# Patient Record
Sex: FEMALE | Race: WHITE | Employment: OTHER | URBAN - NONMETROPOLITAN AREA
[De-identification: names, ages, dates, MRNs, and addresses within clinical notes are randomized per-mention and may not be internally consistent; named-entity substitution may affect disease eponyms.]

---

## 2017-01-03 ENCOUNTER — TELEPHONE (OUTPATIENT)
Dept: CARDIOLOGY | Age: 82
End: 2017-01-03

## 2017-01-03 DIAGNOSIS — I10 ESSENTIAL HYPERTENSION: ICD-10-CM

## 2017-01-03 DIAGNOSIS — R01.1 HEART MURMUR: Primary | ICD-10-CM

## 2017-01-03 DIAGNOSIS — G47.33 OBSTRUCTIVE SLEEP APNEA TREATED WITH BIPAP: ICD-10-CM

## 2017-01-04 ENCOUNTER — TELEPHONE (OUTPATIENT)
Dept: PULMONOLOGY | Age: 82
End: 2017-01-04

## 2017-01-18 ENCOUNTER — TELEPHONE (OUTPATIENT)
Dept: CARDIOLOGY | Age: 82
End: 2017-01-18

## 2017-02-10 ENCOUNTER — OFFICE VISIT (OUTPATIENT)
Dept: PULMONOLOGY | Age: 82
End: 2017-02-10

## 2017-02-10 VITALS
DIASTOLIC BLOOD PRESSURE: 60 MMHG | HEART RATE: 80 BPM | BODY MASS INDEX: 23.89 KG/M2 | WEIGHT: 129.8 LBS | OXYGEN SATURATION: 96 % | SYSTOLIC BLOOD PRESSURE: 112 MMHG | HEIGHT: 62 IN

## 2017-02-10 DIAGNOSIS — G47.33 OSA ON CPAP: Primary | ICD-10-CM

## 2017-02-10 DIAGNOSIS — Z99.89 OSA ON CPAP: Primary | ICD-10-CM

## 2017-02-10 PROCEDURE — 1123F ACP DISCUSS/DSCN MKR DOCD: CPT | Performed by: PHYSICIAN ASSISTANT

## 2017-02-10 PROCEDURE — G8427 DOCREV CUR MEDS BY ELIG CLIN: HCPCS | Performed by: PHYSICIAN ASSISTANT

## 2017-02-10 PROCEDURE — 1090F PRES/ABSN URINE INCON ASSESS: CPT | Performed by: PHYSICIAN ASSISTANT

## 2017-02-10 PROCEDURE — G8400 PT W/DXA NO RESULTS DOC: HCPCS | Performed by: PHYSICIAN ASSISTANT

## 2017-02-10 PROCEDURE — G8484 FLU IMMUNIZE NO ADMIN: HCPCS | Performed by: PHYSICIAN ASSISTANT

## 2017-02-10 PROCEDURE — 99213 OFFICE O/P EST LOW 20 MIN: CPT | Performed by: PHYSICIAN ASSISTANT

## 2017-02-10 PROCEDURE — 1036F TOBACCO NON-USER: CPT | Performed by: PHYSICIAN ASSISTANT

## 2017-02-10 PROCEDURE — G8420 CALC BMI NORM PARAMETERS: HCPCS | Performed by: PHYSICIAN ASSISTANT

## 2017-02-10 PROCEDURE — 4040F PNEUMOC VAC/ADMIN/RCVD: CPT | Performed by: PHYSICIAN ASSISTANT

## 2017-05-10 ENCOUNTER — OFFICE VISIT (OUTPATIENT)
Dept: CARDIOLOGY | Age: 82
End: 2017-05-10

## 2017-05-10 VITALS
DIASTOLIC BLOOD PRESSURE: 64 MMHG | HEART RATE: 76 BPM | BODY MASS INDEX: 23.37 KG/M2 | WEIGHT: 127 LBS | HEIGHT: 62 IN | SYSTOLIC BLOOD PRESSURE: 120 MMHG

## 2017-05-10 DIAGNOSIS — E78.01 FAMILIAL HYPERCHOLESTEROLEMIA: ICD-10-CM

## 2017-05-10 DIAGNOSIS — I25.10 CORONARY ARTERY DISEASE INVOLVING NATIVE CORONARY ARTERY OF NATIVE HEART WITHOUT ANGINA PECTORIS: Primary | ICD-10-CM

## 2017-05-10 PROCEDURE — 99213 OFFICE O/P EST LOW 20 MIN: CPT | Performed by: NUCLEAR MEDICINE

## 2017-05-10 PROCEDURE — 1123F ACP DISCUSS/DSCN MKR DOCD: CPT | Performed by: NUCLEAR MEDICINE

## 2017-05-10 PROCEDURE — G8427 DOCREV CUR MEDS BY ELIG CLIN: HCPCS | Performed by: NUCLEAR MEDICINE

## 2017-05-10 PROCEDURE — G8400 PT W/DXA NO RESULTS DOC: HCPCS | Performed by: NUCLEAR MEDICINE

## 2017-05-10 PROCEDURE — 1090F PRES/ABSN URINE INCON ASSESS: CPT | Performed by: NUCLEAR MEDICINE

## 2017-05-10 PROCEDURE — 4040F PNEUMOC VAC/ADMIN/RCVD: CPT | Performed by: NUCLEAR MEDICINE

## 2017-05-10 PROCEDURE — G8599 NO ASA/ANTIPLAT THER USE RNG: HCPCS | Performed by: NUCLEAR MEDICINE

## 2017-05-10 PROCEDURE — 1036F TOBACCO NON-USER: CPT | Performed by: NUCLEAR MEDICINE

## 2017-05-10 PROCEDURE — G8420 CALC BMI NORM PARAMETERS: HCPCS | Performed by: NUCLEAR MEDICINE

## 2017-05-10 RX ORDER — ACETAMINOPHEN 160 MG
2000 TABLET,DISINTEGRATING ORAL
COMMUNITY
End: 2017-07-07 | Stop reason: ALTCHOICE

## 2017-05-12 ENCOUNTER — OFFICE VISIT (OUTPATIENT)
Dept: PULMONOLOGY | Age: 82
End: 2017-05-12

## 2017-05-12 VITALS
HEART RATE: 72 BPM | BODY MASS INDEX: 23.59 KG/M2 | HEIGHT: 62 IN | WEIGHT: 128.2 LBS | SYSTOLIC BLOOD PRESSURE: 116 MMHG | OXYGEN SATURATION: 98 % | DIASTOLIC BLOOD PRESSURE: 62 MMHG

## 2017-05-12 DIAGNOSIS — J98.9: ICD-10-CM

## 2017-05-12 DIAGNOSIS — Z98.2 S/P VP SHUNT: ICD-10-CM

## 2017-05-12 DIAGNOSIS — G47.33 OBSTRUCTIVE SLEEP APNEA TREATED WITH BIPAP: Primary | ICD-10-CM

## 2017-05-12 DIAGNOSIS — G47.31 COMPLEX SLEEP APNEA SYNDROME: ICD-10-CM

## 2017-05-12 PROCEDURE — G8599 NO ASA/ANTIPLAT THER USE RNG: HCPCS | Performed by: PHYSICIAN ASSISTANT

## 2017-05-12 PROCEDURE — 1036F TOBACCO NON-USER: CPT | Performed by: PHYSICIAN ASSISTANT

## 2017-05-12 PROCEDURE — G8420 CALC BMI NORM PARAMETERS: HCPCS | Performed by: PHYSICIAN ASSISTANT

## 2017-05-12 PROCEDURE — G8427 DOCREV CUR MEDS BY ELIG CLIN: HCPCS | Performed by: PHYSICIAN ASSISTANT

## 2017-05-12 PROCEDURE — 4040F PNEUMOC VAC/ADMIN/RCVD: CPT | Performed by: PHYSICIAN ASSISTANT

## 2017-05-12 PROCEDURE — G8400 PT W/DXA NO RESULTS DOC: HCPCS | Performed by: PHYSICIAN ASSISTANT

## 2017-05-12 PROCEDURE — 99214 OFFICE O/P EST MOD 30 MIN: CPT | Performed by: PHYSICIAN ASSISTANT

## 2017-05-12 PROCEDURE — 1090F PRES/ABSN URINE INCON ASSESS: CPT | Performed by: PHYSICIAN ASSISTANT

## 2017-05-12 PROCEDURE — 1123F ACP DISCUSS/DSCN MKR DOCD: CPT | Performed by: PHYSICIAN ASSISTANT

## 2017-05-18 ENCOUNTER — OFFICE VISIT (OUTPATIENT)
Dept: PULMONOLOGY | Age: 82
End: 2017-05-18

## 2017-05-18 VITALS
BODY MASS INDEX: 23.3 KG/M2 | OXYGEN SATURATION: 98 % | DIASTOLIC BLOOD PRESSURE: 58 MMHG | WEIGHT: 126.6 LBS | HEIGHT: 62 IN | HEART RATE: 77 BPM | SYSTOLIC BLOOD PRESSURE: 116 MMHG

## 2017-05-18 DIAGNOSIS — G47.33 OBSTRUCTIVE SLEEP APNEA TREATED WITH BIPAP: Primary | ICD-10-CM

## 2017-05-18 PROCEDURE — 1090F PRES/ABSN URINE INCON ASSESS: CPT | Performed by: PHYSICIAN ASSISTANT

## 2017-05-18 PROCEDURE — G8427 DOCREV CUR MEDS BY ELIG CLIN: HCPCS | Performed by: PHYSICIAN ASSISTANT

## 2017-05-18 PROCEDURE — 99212 OFFICE O/P EST SF 10 MIN: CPT | Performed by: PHYSICIAN ASSISTANT

## 2017-05-18 PROCEDURE — G8599 NO ASA/ANTIPLAT THER USE RNG: HCPCS | Performed by: PHYSICIAN ASSISTANT

## 2017-05-18 PROCEDURE — 1123F ACP DISCUSS/DSCN MKR DOCD: CPT | Performed by: PHYSICIAN ASSISTANT

## 2017-05-18 PROCEDURE — G8420 CALC BMI NORM PARAMETERS: HCPCS | Performed by: PHYSICIAN ASSISTANT

## 2017-05-18 PROCEDURE — 4040F PNEUMOC VAC/ADMIN/RCVD: CPT | Performed by: PHYSICIAN ASSISTANT

## 2017-05-18 PROCEDURE — 1036F TOBACCO NON-USER: CPT | Performed by: PHYSICIAN ASSISTANT

## 2017-05-18 PROCEDURE — G8400 PT W/DXA NO RESULTS DOC: HCPCS | Performed by: PHYSICIAN ASSISTANT

## 2017-07-07 ENCOUNTER — OFFICE VISIT (OUTPATIENT)
Dept: OTOLARYNGOLOGY | Age: 82
End: 2017-07-07

## 2017-07-07 VITALS
DIASTOLIC BLOOD PRESSURE: 60 MMHG | WEIGHT: 127.7 LBS | HEART RATE: 68 BPM | BODY MASS INDEX: 23.5 KG/M2 | HEIGHT: 62 IN | SYSTOLIC BLOOD PRESSURE: 120 MMHG | TEMPERATURE: 98.3 F | RESPIRATION RATE: 20 BRPM

## 2017-07-07 DIAGNOSIS — E05.90 HYPERTHYROIDISM: ICD-10-CM

## 2017-07-07 DIAGNOSIS — H93.13 TINNITUS, BILATERAL: ICD-10-CM

## 2017-07-07 DIAGNOSIS — G47.33 OBSTRUCTIVE SLEEP APNEA TREATED WITH BIPAP: Primary | ICD-10-CM

## 2017-07-07 PROCEDURE — G8420 CALC BMI NORM PARAMETERS: HCPCS | Performed by: OTOLARYNGOLOGY

## 2017-07-07 PROCEDURE — G8427 DOCREV CUR MEDS BY ELIG CLIN: HCPCS | Performed by: OTOLARYNGOLOGY

## 2017-07-07 PROCEDURE — G8400 PT W/DXA NO RESULTS DOC: HCPCS | Performed by: OTOLARYNGOLOGY

## 2017-07-07 PROCEDURE — 1090F PRES/ABSN URINE INCON ASSESS: CPT | Performed by: OTOLARYNGOLOGY

## 2017-07-07 PROCEDURE — 1036F TOBACCO NON-USER: CPT | Performed by: OTOLARYNGOLOGY

## 2017-07-07 PROCEDURE — 4040F PNEUMOC VAC/ADMIN/RCVD: CPT | Performed by: OTOLARYNGOLOGY

## 2017-07-07 PROCEDURE — 99203 OFFICE O/P NEW LOW 30 MIN: CPT | Performed by: OTOLARYNGOLOGY

## 2017-07-07 PROCEDURE — 1123F ACP DISCUSS/DSCN MKR DOCD: CPT | Performed by: OTOLARYNGOLOGY

## 2017-07-07 PROCEDURE — G8599 NO ASA/ANTIPLAT THER USE RNG: HCPCS | Performed by: OTOLARYNGOLOGY

## 2017-07-07 ASSESSMENT — ENCOUNTER SYMPTOMS
COLOR CHANGE: 0
SHORTNESS OF BREATH: 0
ABDOMINAL PAIN: 0
APNEA: 1
SINUS PRESSURE: 0
VOICE CHANGE: 0
NAUSEA: 0
SORE THROAT: 0
DIARRHEA: 0
CHOKING: 0
RHINORRHEA: 0
WHEEZING: 0
VOMITING: 0
TROUBLE SWALLOWING: 0
FACIAL SWELLING: 0
CHEST TIGHTNESS: 0
STRIDOR: 0
COUGH: 0

## 2017-10-04 ENCOUNTER — TELEPHONE (OUTPATIENT)
Dept: CARDIOLOGY CLINIC | Age: 82
End: 2017-10-04

## 2017-10-04 NOTE — TELEPHONE ENCOUNTER
Pre op Risk Assessment    Procedure Septoplasty   Physician Dr. Zeferino Kruse Fax [phone]  Date of surgery/procedure 10-20-17    Last OV 5-10-17  Last Stress 10-31-14  Last Echo 10-31-14

## 2017-10-06 ENCOUNTER — HOSPITAL ENCOUNTER (OUTPATIENT)
Age: 82
Discharge: HOME | End: 2017-10-06
Payer: MEDICARE

## 2017-10-06 LAB
ANION GAP SERPL CALCULATED.3IONS-SCNC: 15 MEQ/L (ref 8–16)
APTT: 30.1 SECONDS (ref 22–38)
BASOPHILS # BLD: 0.5 %
BASOPHILS ABSOLUTE: 0 THOU/MM3 (ref 0–0.1)
BUN BLDV-MCNC: 19 MG/DL (ref 7–22)
CALCIUM SERPL-MCNC: 9.3 MG/DL (ref 8.5–10.5)
CHLORIDE BLD-SCNC: 101 MEQ/L (ref 98–111)
CO2: 28 MEQ/L (ref 23–33)
CREAT SERPL-MCNC: 0.8 MG/DL (ref 0.4–1.2)
EOSINOPHIL # BLD: 3.6 %
EOSINOPHILS ABSOLUTE: 0.2 THOU/MM3 (ref 0–0.4)
GFR SERPL CREATININE-BSD FRML MDRD: 69 ML/MIN/1.73M2
GLUCOSE BLD-MCNC: 94 MG/DL (ref 70–108)
HCT VFR BLD CALC: 38.3 % (ref 37–47)
HEMOGLOBIN: 12.9 GM/DL (ref 12–16)
INR BLD: 0.93 (ref 0.85–1.13)
LYMPHOCYTES # BLD: 25.9 %
LYMPHOCYTES ABSOLUTE: 1.5 THOU/MM3 (ref 1–4.8)
MCH RBC QN AUTO: 30.1 PG (ref 27–31)
MCHC RBC AUTO-ENTMCNC: 33.5 GM/DL (ref 33–37)
MCV RBC AUTO: 89.7 FL (ref 81–99)
MONOCYTES # BLD: 7 %
MONOCYTES ABSOLUTE: 0.4 THOU/MM3 (ref 0.4–1.3)
NUCLEATED RED BLOOD CELLS: 0 /100 WBC
PDW BLD-RTO: 13.4 % (ref 11.5–14.5)
PLATELET # BLD: 197 THOU/MM3 (ref 130–400)
PMV BLD AUTO: 8.4 MCM (ref 7.4–10.4)
POTASSIUM SERPL-SCNC: 4.1 MEQ/L (ref 3.5–5.2)
RBC # BLD: 4.27 MILL/MM3 (ref 4.2–5.4)
RBC # BLD: NORMAL 10*6/UL
SEG NEUTROPHILS: 63 %
SEGMENTED NEUTROPHILS ABSOLUTE COUNT: 3.6 THOU/MM3 (ref 1.8–7.7)
SODIUM BLD-SCNC: 144 MEQ/L (ref 135–145)
WBC # BLD: 5.7 THOU/MM3 (ref 4.8–10.8)

## 2017-10-06 PROCEDURE — 93005 ELECTROCARDIOGRAM TRACING: CPT | Performed by: OTOLARYNGOLOGY

## 2017-10-06 PROCEDURE — 85730 THROMBOPLASTIN TIME PARTIAL: CPT

## 2017-10-06 PROCEDURE — 85610 PROTHROMBIN TIME: CPT

## 2017-10-06 PROCEDURE — 36415 COLL VENOUS BLD VENIPUNCTURE: CPT

## 2017-10-06 PROCEDURE — 80048 BASIC METABOLIC PNL TOTAL CA: CPT

## 2017-10-06 PROCEDURE — 85025 COMPLETE CBC W/AUTO DIFF WBC: CPT

## 2017-10-07 LAB
EKG ATRIAL RATE: 60 BPM
EKG P AXIS: 63 DEGREES
EKG P-R INTERVAL: 186 MS
EKG Q-T INTERVAL: 440 MS
EKG QRS DURATION: 98 MS
EKG QTC CALCULATION (BAZETT): 440 MS
EKG R AXIS: 91 DEGREES
EKG T AXIS: -30 DEGREES
EKG VENTRICULAR RATE: 60 BPM

## 2017-10-24 ENCOUNTER — OFFICE VISIT (OUTPATIENT)
Dept: PULMONOLOGY | Age: 82
End: 2017-10-24
Payer: MEDICARE

## 2017-10-24 ENCOUNTER — TELEPHONE (OUTPATIENT)
Dept: PULMONOLOGY | Age: 82
End: 2017-10-24

## 2017-10-24 VITALS
HEIGHT: 62 IN | WEIGHT: 128.2 LBS | DIASTOLIC BLOOD PRESSURE: 60 MMHG | OXYGEN SATURATION: 95 % | HEART RATE: 82 BPM | BODY MASS INDEX: 23.59 KG/M2 | SYSTOLIC BLOOD PRESSURE: 116 MMHG

## 2017-10-24 DIAGNOSIS — G47.33 OBSTRUCTIVE SLEEP APNEA TREATED WITH BIPAP: Primary | ICD-10-CM

## 2017-10-24 PROCEDURE — G8427 DOCREV CUR MEDS BY ELIG CLIN: HCPCS | Performed by: PHYSICIAN ASSISTANT

## 2017-10-24 PROCEDURE — 1123F ACP DISCUSS/DSCN MKR DOCD: CPT | Performed by: PHYSICIAN ASSISTANT

## 2017-10-24 PROCEDURE — 1090F PRES/ABSN URINE INCON ASSESS: CPT | Performed by: PHYSICIAN ASSISTANT

## 2017-10-24 PROCEDURE — G8420 CALC BMI NORM PARAMETERS: HCPCS | Performed by: PHYSICIAN ASSISTANT

## 2017-10-24 PROCEDURE — 4040F PNEUMOC VAC/ADMIN/RCVD: CPT | Performed by: PHYSICIAN ASSISTANT

## 2017-10-24 PROCEDURE — 1036F TOBACCO NON-USER: CPT | Performed by: PHYSICIAN ASSISTANT

## 2017-10-24 PROCEDURE — G8484 FLU IMMUNIZE NO ADMIN: HCPCS | Performed by: PHYSICIAN ASSISTANT

## 2017-10-24 PROCEDURE — G8599 NO ASA/ANTIPLAT THER USE RNG: HCPCS | Performed by: PHYSICIAN ASSISTANT

## 2017-10-24 PROCEDURE — 99213 OFFICE O/P EST LOW 20 MIN: CPT | Performed by: PHYSICIAN ASSISTANT

## 2017-10-24 PROCEDURE — G8400 PT W/DXA NO RESULTS DOC: HCPCS | Performed by: PHYSICIAN ASSISTANT

## 2017-10-24 NOTE — PATIENT INSTRUCTIONS
Health Maintenance Due   Topic Date Due    DTaP/Tdap/Td vaccine (1 - Tdap) 06/06/1955    Zostavax vaccine  06/06/1996    DEXA (modify frequency per FRAX score)  06/06/2001    Pneumococcal low/med risk (1 of 2 - PCV13) 06/06/2001    Flu vaccine (1) 09/01/2017

## 2017-10-24 NOTE — PROGRESS NOTES
[facility]      Humble Oswald                                         [id number]  10/24/2017   Chief Complaint   Patient presents with    Sleep Apnea     5 month f/u pt has had surgery Dr Patti Carl) needs pressure adjusted shrme download        Pt of Dr. Ajay Shore    PAP Download:   Original or initial  AHI: 50.3     Date of initial study: 11/12/13    [x] Compliant  96.7%   []  Noncompliant 3.3 %     PAP Type Bi-pap   Level  9/13   Avg Hrs/Day [id number]  AHI: 20.8   Recorded compliance dates , 9/20/17  to 10/19/17- prior to OR  Machine/Mfg: Respironics Interface: full    Provider:  [x][facility]  []Christina  [][facility]  [][facility]         [][facility] []Other:   Neck Size: 14  Mallampati 2  ESS: 20       Presentation:   Fredy Schwartz presents for sleep medicine follow up for obstructive sleep apnea  Since the last visit, Fredy Schwartz is doing reasonably well with their sleep machine. Other comments: She has been difficult to titrate and control AHI. She was referred to ENT for evaluation of possible upper airway obstruction. She was initally seen by Dr. Jasen Rodríguez and did not feel comfortable with him. She went to Dr. Patti Salas in [facility]. She had her septum repaired and nasal polyp removed. She states that she can breath so much easier. She just had procedure done a few days ago and had not started using PAP again. She has had many issues with mask leak also. Equipment issues: The pressure is somewhat  acceptable, the mask is acceptable and she  is  using the humidity. Progress History:   Since last visit any new medical issues? Yes nasal surgery  New ER or hospitlal visits? No  Any new or changes in medicines? No  Any new sleep medicines? No    Sleep issues:  Do you feel better? No  More rested? No   Better concentration?  no      Past Medical History:   Diagnosis Date    Arthritis     CAD (coronary artery disease)     Essential hypertension     Heart murmur    

## 2017-10-26 ENCOUNTER — HOSPITAL ENCOUNTER (OUTPATIENT)
Dept: CT IMAGING | Age: 82
Discharge: HOME | End: 2017-10-26
Payer: MEDICARE

## 2017-10-26 DIAGNOSIS — G91.9 HYDROCEPHALUS (HCC): ICD-10-CM

## 2017-10-26 DIAGNOSIS — G91.2 IDIOPATHIC NORMAL PRESSURE HYDROCEPHALUS (HCC): ICD-10-CM

## 2017-10-26 PROCEDURE — 70450 CT HEAD/BRAIN W/O DYE: CPT

## 2018-01-31 ENCOUNTER — OFFICE VISIT (OUTPATIENT)
Dept: PULMONOLOGY | Age: 83
End: 2018-01-31
Payer: MEDICARE

## 2018-01-31 VITALS
SYSTOLIC BLOOD PRESSURE: 124 MMHG | HEIGHT: 63 IN | HEART RATE: 71 BPM | BODY MASS INDEX: 23.21 KG/M2 | WEIGHT: 131 LBS | OXYGEN SATURATION: 98 % | DIASTOLIC BLOOD PRESSURE: 62 MMHG

## 2018-01-31 DIAGNOSIS — G47.33 OBSTRUCTIVE SLEEP APNEA TREATED WITH BIPAP: Primary | ICD-10-CM

## 2018-01-31 PROCEDURE — G8420 CALC BMI NORM PARAMETERS: HCPCS | Performed by: PHYSICIAN ASSISTANT

## 2018-01-31 PROCEDURE — 99213 OFFICE O/P EST LOW 20 MIN: CPT | Performed by: PHYSICIAN ASSISTANT

## 2018-01-31 PROCEDURE — G8400 PT W/DXA NO RESULTS DOC: HCPCS | Performed by: PHYSICIAN ASSISTANT

## 2018-01-31 PROCEDURE — G8484 FLU IMMUNIZE NO ADMIN: HCPCS | Performed by: PHYSICIAN ASSISTANT

## 2018-01-31 PROCEDURE — G8427 DOCREV CUR MEDS BY ELIG CLIN: HCPCS | Performed by: PHYSICIAN ASSISTANT

## 2018-01-31 PROCEDURE — 1123F ACP DISCUSS/DSCN MKR DOCD: CPT | Performed by: PHYSICIAN ASSISTANT

## 2018-01-31 PROCEDURE — 1036F TOBACCO NON-USER: CPT | Performed by: PHYSICIAN ASSISTANT

## 2018-01-31 PROCEDURE — 4040F PNEUMOC VAC/ADMIN/RCVD: CPT | Performed by: PHYSICIAN ASSISTANT

## 2018-01-31 PROCEDURE — 1090F PRES/ABSN URINE INCON ASSESS: CPT | Performed by: PHYSICIAN ASSISTANT

## 2018-01-31 RX ORDER — SODIUM CHLORIDE/ALOE VERA
GEL (GRAM) NASAL PRN
COMMUNITY
End: 2018-09-11

## 2018-01-31 NOTE — PATIENT INSTRUCTIONS
Health Maintenance Due   Topic Date Due    DTaP/Tdap/Td vaccine (1 - Tdap) 06/06/1955    Zostavax vaccine  06/06/1996    DEXA (modify frequency per FRAX score)  06/06/2001    Pneumococcal low/med risk (1 of 2 - PCV13) 06/06/2001    TSH testing  12/12/2015

## 2018-01-31 NOTE — PROGRESS NOTES
[facility]      Andrey Mcmahan                                         [id number]  2018   Chief Complaint   Patient presents with    Sleep Apnea     MISAEL, 3 mo f/u download        Pt of Dr. Cande Kearns    PAP Download:   Original or initial  AHI: 50.3     Date of initial study: 13    [x] Compliant  95.%   -  Noncompliant 4.3 %     PAP Type BiPAP   Level  8.0 / 12.0   Avg Hrs/Day 7:09:38  AHI: 18.5   Recorded compliance dates , 1/3/18  to 18   Machine/Mfg: Respironics Interface: FFM    Provider:  [x][facility]  [][facility]  [][employer]  [][facility]         [][facility] []Other:   Neck Size: 14\"  Mallampati Mallampati 2  ESS:  18    Here is a scan of the most recent download:          Presentation:   Rossi Smith presents for sleep medicine follow up for obstructive sleep apnea  Since the last visit, Rossi Smith is doing reasonably well with their sleep machine. Other comments: She has been very difficult to control her apneas. She had a re-titration in Dec 2016 and has never been controlled since. She even had septoplasty to try to improve air issues. She has had issues with a large leak also and was changed to the air touch mask a week ago and her leak appears improved. Equipment issues: The pressure is  acceptable, the mask is acceptable and she  is  using the humidity. Sleep issues:  Do you feel better? Yes  More rested? Yes   Better concentration? yes    Progress History:   Since last visit any new medical issues? No  New ER or hospitlal visits? No  Any new or changes in medicines? No  Any new sleep medicines?  No        Past Medical History:   Diagnosis Date    Arthritis     CAD (coronary artery disease)     Essential hypertension     Heart murmur     Hyperlipidemia     Sleep apnea     Thyroid disease     Hypothyroid    VSD (ventricular septal defect)        Past Surgical History:   Procedure Laterality Date    BLADDER SUSPENSION       SECTION      x2  COLONOSCOPY      DILATATION, ESOPHAGUS      DOPPLER ECHOCARDIOGRAPHY  10-01-10    EF 45-50%    EYE SURGERY Bilateral     cataracts    HYSTERECTOMY  1990    KNEE CARTILAGE SURGERY Right     OTHER SURGICAL HISTORY  6/1/2015    VAGINAL ANTERIOR AND POSTERIOR REPAIR , SOLYX BLADDER SLING, CYSTOSCOPY    SHOULDER SURGERY Right     TUBAL LIGATION  1962    VENTRICULOPERITONEAL SHUNT Right 10/11/2016    Dr Kesha De Oliveira       Social History   Substance Use Topics    Smoking status: Never Smoker    Smokeless tobacco: Never Used    Alcohol use No       Allergies   Allergen Reactions    Iv Dye [Iodides]     Sulfa Antibiotics Hives    Iodine Rash     blisters       Current Outpatient Prescriptions   Medication Sig Dispense Refill    saline nasal gel (AYR) GEL by Nasal route as needed for Congestion      CPAP Machine MISC by Does not apply route Please change BiPAP pressure to IPAP 12 EPAP 8 cm H20. 1 each 0    Probiotic Product (PROBIOTIC DAILY PO) Take by mouth daily      Multiple Vitamin (MULTI VITAMIN PO) Take by mouth daily      CALCIUM PO Take by mouth daily      metroNIDAZOLE (METROCREAM) 0.75 % cream daily  0    levothyroxine (SYNTHROID) 112 MCG tablet Take 112 mcg by mouth Daily.  simvastatin (ZOCOR) 10 MG tablet Take 10 mg by mouth nightly. No current facility-administered medications for this visit.         Family History   Problem Relation Age of Onset    Cancer Sister     Heart Disease Sister         Review of Systems -   General ROS: gained 3 lbs over 3 months  ENT ROS: negative for - nasal congestion, oral lesions or sore throat  Hematological and Lymphatic ROS: negative  Endocrine ROS: negative  Respiratory ROS: no cough, shortness of breath, or wheezing  Cardiovascular ROS: no chest pain   Gastrointestinal ROS: no abdominal pain, change in bowel habits, or black or bloody stools  Musculoskeletal ROS: negative  Neurological ROS: negative    Physical Exam:    BMI:  Body mass index

## 2018-05-02 ENCOUNTER — HOSPITAL ENCOUNTER (OUTPATIENT)
Dept: CT IMAGING | Age: 83
Discharge: HOME | End: 2018-05-02
Payer: MEDICARE

## 2018-05-02 DIAGNOSIS — G91.2 NPH (NORMAL PRESSURE HYDROCEPHALUS) (HCC): ICD-10-CM

## 2018-05-02 PROCEDURE — 70450 CT HEAD/BRAIN W/O DYE: CPT

## 2018-05-16 ENCOUNTER — OFFICE VISIT (OUTPATIENT)
Dept: CARDIOLOGY CLINIC | Age: 83
End: 2018-05-16
Payer: MEDICARE

## 2018-05-16 VITALS
HEIGHT: 62 IN | BODY MASS INDEX: 23.55 KG/M2 | WEIGHT: 128 LBS | SYSTOLIC BLOOD PRESSURE: 120 MMHG | DIASTOLIC BLOOD PRESSURE: 46 MMHG | HEART RATE: 72 BPM

## 2018-05-16 DIAGNOSIS — I25.10 CORONARY ARTERY DISEASE INVOLVING NATIVE CORONARY ARTERY OF NATIVE HEART WITHOUT ANGINA PECTORIS: Primary | ICD-10-CM

## 2018-05-16 DIAGNOSIS — E78.00 PURE HYPERCHOLESTEROLEMIA: ICD-10-CM

## 2018-05-16 PROCEDURE — G8400 PT W/DXA NO RESULTS DOC: HCPCS | Performed by: NUCLEAR MEDICINE

## 2018-05-16 PROCEDURE — 99213 OFFICE O/P EST LOW 20 MIN: CPT | Performed by: NUCLEAR MEDICINE

## 2018-05-16 PROCEDURE — 1036F TOBACCO NON-USER: CPT | Performed by: NUCLEAR MEDICINE

## 2018-05-16 PROCEDURE — 1090F PRES/ABSN URINE INCON ASSESS: CPT | Performed by: NUCLEAR MEDICINE

## 2018-05-16 PROCEDURE — G8599 NO ASA/ANTIPLAT THER USE RNG: HCPCS | Performed by: NUCLEAR MEDICINE

## 2018-05-16 PROCEDURE — G8427 DOCREV CUR MEDS BY ELIG CLIN: HCPCS | Performed by: NUCLEAR MEDICINE

## 2018-05-16 PROCEDURE — 4040F PNEUMOC VAC/ADMIN/RCVD: CPT | Performed by: NUCLEAR MEDICINE

## 2018-05-16 PROCEDURE — 1123F ACP DISCUSS/DSCN MKR DOCD: CPT | Performed by: NUCLEAR MEDICINE

## 2018-05-16 PROCEDURE — G8420 CALC BMI NORM PARAMETERS: HCPCS | Performed by: NUCLEAR MEDICINE

## 2018-05-16 RX ORDER — PANTOPRAZOLE SODIUM 40 MG/1
40 TABLET, DELAYED RELEASE ORAL DAILY
COMMUNITY
End: 2018-08-01 | Stop reason: ALTCHOICE

## 2018-08-01 ENCOUNTER — OFFICE VISIT (OUTPATIENT)
Dept: PULMONOLOGY | Age: 83
End: 2018-08-01
Payer: MEDICARE

## 2018-08-01 VITALS
BODY MASS INDEX: 23.59 KG/M2 | OXYGEN SATURATION: 98 % | SYSTOLIC BLOOD PRESSURE: 110 MMHG | DIASTOLIC BLOOD PRESSURE: 62 MMHG | WEIGHT: 128.2 LBS | HEIGHT: 62 IN | HEART RATE: 73 BPM

## 2018-08-01 DIAGNOSIS — G47.33 OBSTRUCTIVE SLEEP APNEA TREATED WITH BIPAP: ICD-10-CM

## 2018-08-01 DIAGNOSIS — G47.31 COMPLEX SLEEP APNEA SYNDROME: Primary | ICD-10-CM

## 2018-08-01 PROCEDURE — G8599 NO ASA/ANTIPLAT THER USE RNG: HCPCS | Performed by: PHYSICIAN ASSISTANT

## 2018-08-01 PROCEDURE — G8400 PT W/DXA NO RESULTS DOC: HCPCS | Performed by: PHYSICIAN ASSISTANT

## 2018-08-01 PROCEDURE — 1123F ACP DISCUSS/DSCN MKR DOCD: CPT | Performed by: PHYSICIAN ASSISTANT

## 2018-08-01 PROCEDURE — G8427 DOCREV CUR MEDS BY ELIG CLIN: HCPCS | Performed by: PHYSICIAN ASSISTANT

## 2018-08-01 PROCEDURE — 99213 OFFICE O/P EST LOW 20 MIN: CPT | Performed by: PHYSICIAN ASSISTANT

## 2018-08-01 PROCEDURE — 1101F PT FALLS ASSESS-DOCD LE1/YR: CPT | Performed by: PHYSICIAN ASSISTANT

## 2018-08-01 PROCEDURE — 1036F TOBACCO NON-USER: CPT | Performed by: PHYSICIAN ASSISTANT

## 2018-08-01 PROCEDURE — 1090F PRES/ABSN URINE INCON ASSESS: CPT | Performed by: PHYSICIAN ASSISTANT

## 2018-08-01 PROCEDURE — G8420 CALC BMI NORM PARAMETERS: HCPCS | Performed by: PHYSICIAN ASSISTANT

## 2018-08-01 PROCEDURE — 4040F PNEUMOC VAC/ADMIN/RCVD: CPT | Performed by: PHYSICIAN ASSISTANT

## 2018-08-01 ASSESSMENT — ENCOUNTER SYMPTOMS
SPUTUM PRODUCTION: 0
GASTROINTESTINAL NEGATIVE: 1
HEARTBURN: 0
EYES NEGATIVE: 1
WHEEZING: 0
RESPIRATORY NEGATIVE: 1
SINUS PAIN: 0
SORE THROAT: 0
ORTHOPNEA: 0
COUGH: 0
SHORTNESS OF BREATH: 0
NAUSEA: 0

## 2018-08-01 NOTE — PROGRESS NOTES
Laterality Date    BLADDER SUSPENSION       SECTION      x2    COLONOSCOPY      DILATATION, ESOPHAGUS      DOPPLER ECHOCARDIOGRAPHY  10-01-10    EF 45-50%    EYE SURGERY Bilateral     cataracts    HYSTERECTOMY      KNEE CARTILAGE SURGERY Right     OTHER SURGICAL HISTORY  2015    VAGINAL ANTERIOR AND POSTERIOR REPAIR , SOLYX BLADDER SLING, CYSTOSCOPY    SHOULDER SURGERY Right     SINUS SURGERY      TUBAL LIGATION      VENTRICULOPERITONEAL SHUNT Right 10/11/2016    Dr Isac Thompson       Social History   Substance Use Topics    Smoking status: Never Smoker    Smokeless tobacco: Never Used    Alcohol use No       Allergies   Allergen Reactions    Iv Dye [Iodides]     Sulfa Antibiotics Hives    Iodine Rash     blisters       Current Outpatient Prescriptions   Medication Sig Dispense Refill    saline nasal gel (AYR) GEL by Nasal route as needed for Congestion      CPAP Machine MISC by Does not apply route Please change BIPAP pressure to IPAP 13 and EPAP 10 cm H20. 1 each 0    Probiotic Product (PROBIOTIC DAILY PO) Take by mouth daily      Multiple Vitamin (MULTI VITAMIN PO) Take by mouth daily      CALCIUM PO Take by mouth daily      metroNIDAZOLE (METROCREAM) 0.75 % cream daily  0    levothyroxine (SYNTHROID) 112 MCG tablet Take 112 mcg by mouth Daily.  simvastatin (ZOCOR) 10 MG tablet Take 10 mg by mouth nightly. No current facility-administered medications for this visit. Family History   Problem Relation Age of Onset    Cancer Sister     Heart Disease Sister         Review of Systems -   Review of Systems   Constitutional: Negative. Negative for chills and fever. HENT: Negative. Negative for congestion, nosebleeds, sinus pain and sore throat. Eyes: Negative. Respiratory: Negative. Negative for cough, sputum production, shortness of breath and wheezing. Cardiovascular: Negative. Negative for chest pain, orthopnea and PND. Gastrointestinal: Negative. Negative for heartburn and nausea. Genitourinary: Negative. Musculoskeletal: Negative. Negative for joint pain and myalgias. Skin: Negative. Neurological: Negative. Negative for dizziness, weakness and headaches. Endo/Heme/Allergies: Negative. Psychiatric/Behavioral: Negative. Negative for depression. The patient is not nervous/anxious and does not have insomnia. All other systems reviewed and are negative. Physical Exam:    BMI:  Body mass index is 23.45 kg/m². Wt Readings from Last 3 Encounters:   08/01/18 128 lb 3.2 oz (58.2 kg)   05/16/18 128 lb (58.1 kg)   01/31/18 131 lb (59.4 kg)     Vitals: /62 (Site: Left Arm, Position: Sitting)   Pulse 73   Ht 5' 2\" (1.575 m)   Wt 128 lb 3.2 oz (58.2 kg)   SpO2 98%   BMI 23.45 kg/m²       Physical Exam   Constitutional: She is oriented to person, place, and time and well-developed, well-nourished, and in no distress. No distress. HENT:   Head: Normocephalic and atraumatic. Mouth/Throat: Oropharynx is clear and moist. No oropharyngeal exudate. Eyes: Pupils are equal, round, and reactive to light. Neck: Normal range of motion. Neck supple. Cardiovascular: Normal rate, regular rhythm and normal heart sounds. Pulmonary/Chest: Effort normal and breath sounds normal. No stridor. No respiratory distress. Abdominal: Soft. Bowel sounds are normal.   Musculoskeletal: Normal range of motion. She exhibits no edema. Neurological: She is alert and oriented to person, place, and time. Gait normal.   Skin: Skin is warm and dry. She is not diaphoretic. Psychiatric: Mood, memory, affect and judgment normal.         ASSESSMENT/DIAGNOSIS     Diagnosis Orders   1. Obstructive sleep apnea treated with BiPAP              Plan   Do you need any equipment today? No  - AHI continues to be elevated.   Suspect her central effects are related to NPH and shunt  - Will make adjustment to Auto Bipap IPAP max 14 and EPAP min 6 and PS 4  - Download in 1 month and if no improvement will need bipap/ASV titration  - She  was advised to continue current positive airway pressure therapy with above described pressure. - She  advised to keep good compliance with current recommended pressure to get optimal results and clinical improvement  - Recommend 7-9 hours of sleep with PAP  - She was advised to call [employer] regarding supplies if needed. - She call my office for earlier appointment if needed for worsening of sleep symptoms.   - She was instructed on weight loss  - Bessy Skelton was educated about my impression and plan. Patient verbalizes understanding.   We will see Fatou Lancaster back pending next download    Bhanu Olivares PA-C, ASHLI  8/1/2018

## 2018-08-29 ENCOUNTER — HOSPITAL ENCOUNTER (OUTPATIENT)
Dept: CT IMAGING | Age: 83
Discharge: HOME | End: 2018-08-29
Payer: MEDICARE

## 2018-08-29 DIAGNOSIS — Z98.2 VP (VENTRICULOPERITONEAL) SHUNT STATUS: ICD-10-CM

## 2018-08-29 DIAGNOSIS — G91.2 NPH (NORMAL PRESSURE HYDROCEPHALUS) (HCC): ICD-10-CM

## 2018-08-29 PROCEDURE — 70450 CT HEAD/BRAIN W/O DYE: CPT

## 2018-09-11 ENCOUNTER — OFFICE VISIT (OUTPATIENT)
Dept: PULMONOLOGY | Age: 83
End: 2018-09-11
Payer: MEDICARE

## 2018-09-11 VITALS
HEIGHT: 62 IN | OXYGEN SATURATION: 98 % | SYSTOLIC BLOOD PRESSURE: 124 MMHG | WEIGHT: 126.6 LBS | HEART RATE: 63 BPM | BODY MASS INDEX: 23.3 KG/M2 | DIASTOLIC BLOOD PRESSURE: 68 MMHG

## 2018-09-11 DIAGNOSIS — G47.31 COMPLEX SLEEP APNEA SYNDROME: Primary | ICD-10-CM

## 2018-09-11 PROCEDURE — G8427 DOCREV CUR MEDS BY ELIG CLIN: HCPCS | Performed by: PHYSICIAN ASSISTANT

## 2018-09-11 PROCEDURE — G8400 PT W/DXA NO RESULTS DOC: HCPCS | Performed by: PHYSICIAN ASSISTANT

## 2018-09-11 PROCEDURE — 1090F PRES/ABSN URINE INCON ASSESS: CPT | Performed by: PHYSICIAN ASSISTANT

## 2018-09-11 PROCEDURE — G8420 CALC BMI NORM PARAMETERS: HCPCS | Performed by: PHYSICIAN ASSISTANT

## 2018-09-11 PROCEDURE — 4040F PNEUMOC VAC/ADMIN/RCVD: CPT | Performed by: PHYSICIAN ASSISTANT

## 2018-09-11 PROCEDURE — G8599 NO ASA/ANTIPLAT THER USE RNG: HCPCS | Performed by: PHYSICIAN ASSISTANT

## 2018-09-11 PROCEDURE — 99213 OFFICE O/P EST LOW 20 MIN: CPT | Performed by: PHYSICIAN ASSISTANT

## 2018-09-11 PROCEDURE — 1123F ACP DISCUSS/DSCN MKR DOCD: CPT | Performed by: PHYSICIAN ASSISTANT

## 2018-09-11 PROCEDURE — 1036F TOBACCO NON-USER: CPT | Performed by: PHYSICIAN ASSISTANT

## 2018-09-11 PROCEDURE — 1101F PT FALLS ASSESS-DOCD LE1/YR: CPT | Performed by: PHYSICIAN ASSISTANT

## 2018-09-11 ASSESSMENT — ENCOUNTER SYMPTOMS
SINUS PAIN: 0
COUGH: 0
SPUTUM PRODUCTION: 0
SHORTNESS OF BREATH: 0
WHEEZING: 0
HEARTBURN: 0
GASTROINTESTINAL NEGATIVE: 1
SORE THROAT: 0
NAUSEA: 0
EYES NEGATIVE: 1
ORTHOPNEA: 0
RESPIRATORY NEGATIVE: 1

## 2018-09-11 NOTE — PROGRESS NOTES
SURGERY Bilateral     cataracts    HYSTERECTOMY  1990    KNEE CARTILAGE SURGERY Right     OTHER SURGICAL HISTORY  6/1/2015    VAGINAL ANTERIOR AND POSTERIOR REPAIR , SOLYX BLADDER SLING, CYSTOSCOPY    SHOULDER SURGERY Right     SINUS SURGERY      TUBAL LIGATION  1962    VENTRICULOPERITONEAL SHUNT Right 10/11/2016    Dr Alvaro Nguyen       Social History   Substance Use Topics    Smoking status: Never Smoker    Smokeless tobacco: Never Used    Alcohol use No       Allergies   Allergen Reactions    Iv Dye [Iodides]     Sulfa Antibiotics Hives    Iodine Rash     blisters       Current Outpatient Prescriptions   Medication Sig Dispense Refill    CPAP Machine MISC by Does not apply route Please change BIPAP pressure to auto IPAP max 14 and EPAP min 6 and PS 4  cm H20. 1 each 0    Probiotic Product (PROBIOTIC DAILY PO) Take by mouth daily      Multiple Vitamin (MULTI VITAMIN PO) Take by mouth daily      CALCIUM PO Take by mouth daily      metroNIDAZOLE (METROCREAM) 0.75 % cream daily  0    levothyroxine (SYNTHROID) 112 MCG tablet Take 112 mcg by mouth Daily.  simvastatin (ZOCOR) 10 MG tablet Take 10 mg by mouth nightly. No current facility-administered medications for this visit. Family History   Problem Relation Age of Onset    Cancer Sister     Heart Disease Sister         Review of Systems -   Review of Systems   Constitutional: Negative. Negative for chills and fever. HENT: Negative. Negative for congestion, nosebleeds, sinus pain and sore throat. Eyes: Negative. Respiratory: Negative. Negative for cough, sputum production, shortness of breath and wheezing. Cardiovascular: Negative. Negative for chest pain, orthopnea and PND. Gastrointestinal: Negative. Negative for heartburn and nausea. Genitourinary: Negative. Musculoskeletal: Negative. Negative for joint pain and myalgias. Skin: Negative. Neurological: Negative.   Negative for dizziness, weakness and is more receptive now. - She  was advised to continue current positive airway pressure therapy with above described pressure. - She  advised to keep good compliance with current recommended pressure to get optimal results and clinical improvement  - Recommend 7-9 hours of sleep with PAP  - She was advised to call [employer] regarding supplies if needed. - She call my office for earlier appointment if needed for worsening of sleep symptoms.   - She was instructed on weight loss  - Edamaria esther Wan was educated about my impression and plan. Patient verbalizes understanding.   We will see Elder Shoulder back in: 3 months with download    Michelle Bonner PA-C, MPAS  9/11/2018

## 2019-03-12 ENCOUNTER — OFFICE VISIT (OUTPATIENT)
Dept: PULMONOLOGY | Age: 84
End: 2019-03-12
Payer: MEDICARE

## 2019-03-12 VITALS
DIASTOLIC BLOOD PRESSURE: 68 MMHG | WEIGHT: 130 LBS | HEIGHT: 62 IN | OXYGEN SATURATION: 98 % | HEART RATE: 68 BPM | SYSTOLIC BLOOD PRESSURE: 108 MMHG | BODY MASS INDEX: 23.92 KG/M2

## 2019-03-12 DIAGNOSIS — G91.2 NPH (NORMAL PRESSURE HYDROCEPHALUS) (HCC): Chronic | ICD-10-CM

## 2019-03-12 DIAGNOSIS — Q21.0 VSD (VENTRICULAR SEPTAL DEFECT): ICD-10-CM

## 2019-03-12 DIAGNOSIS — G47.31 COMPLEX SLEEP APNEA SYNDROME: Primary | ICD-10-CM

## 2019-03-12 PROCEDURE — G8427 DOCREV CUR MEDS BY ELIG CLIN: HCPCS | Performed by: PHYSICIAN ASSISTANT

## 2019-03-12 PROCEDURE — 1036F TOBACCO NON-USER: CPT | Performed by: PHYSICIAN ASSISTANT

## 2019-03-12 PROCEDURE — G8420 CALC BMI NORM PARAMETERS: HCPCS | Performed by: PHYSICIAN ASSISTANT

## 2019-03-12 PROCEDURE — 1090F PRES/ABSN URINE INCON ASSESS: CPT | Performed by: PHYSICIAN ASSISTANT

## 2019-03-12 PROCEDURE — 1123F ACP DISCUSS/DSCN MKR DOCD: CPT | Performed by: PHYSICIAN ASSISTANT

## 2019-03-12 PROCEDURE — 4040F PNEUMOC VAC/ADMIN/RCVD: CPT | Performed by: PHYSICIAN ASSISTANT

## 2019-03-12 PROCEDURE — 1101F PT FALLS ASSESS-DOCD LE1/YR: CPT | Performed by: PHYSICIAN ASSISTANT

## 2019-03-12 PROCEDURE — 99214 OFFICE O/P EST MOD 30 MIN: CPT | Performed by: PHYSICIAN ASSISTANT

## 2019-03-12 PROCEDURE — G8484 FLU IMMUNIZE NO ADMIN: HCPCS | Performed by: PHYSICIAN ASSISTANT

## 2019-03-12 PROCEDURE — G8400 PT W/DXA NO RESULTS DOC: HCPCS | Performed by: PHYSICIAN ASSISTANT

## 2019-03-12 ASSESSMENT — ENCOUNTER SYMPTOMS
SHORTNESS OF BREATH: 0
BACK PAIN: 0
NAUSEA: 0
COUGH: 0
STRIDOR: 0
EYES NEGATIVE: 1
CHEST TIGHTNESS: 0
DIARRHEA: 0
ALLERGIC/IMMUNOLOGIC NEGATIVE: 1
WHEEZING: 0

## 2019-03-20 ENCOUNTER — HOSPITAL ENCOUNTER (OUTPATIENT)
Dept: NON INVASIVE DIAGNOSTICS | Age: 84
Discharge: HOME | End: 2019-03-20
Payer: MEDICARE

## 2019-03-20 DIAGNOSIS — G47.31 COMPLEX SLEEP APNEA SYNDROME: ICD-10-CM

## 2019-03-20 LAB
LV EF: 60 %
LVEF MODALITY: NORMAL

## 2019-03-20 PROCEDURE — 93306 TTE W/DOPPLER COMPLETE: CPT

## 2019-03-21 ENCOUNTER — TELEPHONE (OUTPATIENT)
Dept: PULMONOLOGY | Age: 84
End: 2019-03-21

## 2019-04-22 ENCOUNTER — HOSPITAL ENCOUNTER (OUTPATIENT)
Dept: SLEEP CENTER | Age: 84
Discharge: HOME | End: 2019-04-24
Payer: MEDICARE

## 2019-04-22 DIAGNOSIS — G47.31 COMPLEX SLEEP APNEA SYNDROME: ICD-10-CM

## 2019-04-22 PROCEDURE — 95811 POLYSOM 6/>YRS CPAP 4/> PARM: CPT

## 2019-04-23 DIAGNOSIS — G47.33 OSA TREATED WITH BIPAP: Primary | ICD-10-CM

## 2019-04-23 LAB — STATUS: NORMAL

## 2019-04-24 NOTE — PROGRESS NOTES
[address] [place] [address]                               SLEEP STUDY REPORT    PATIENT NAME: Stacey Covermurray                    :        1935  MED REC NO:   [id number]                           ROOM:  ACCOUNT NO:   [de-identified]                           ADMIT DATE: 2019  PROVIDER:     Clementine Wiley. MD [place]    DATE OF STUDY:  2019    BIPAP RETITRATION STUDY REPORT    REFERRING PROVIDER:  Can Back PA-C    The patient's height is 62 inches, weight is 130 pounds with a BMI of  23.8. HISTORY:  The patient is an 28-year-old female diagnosed with moderately  severe obstructive sleep apnea in the past.  The patient was titrated to  a BiPAP pressure of 15/11 cm of water in the past.  She had a recent  followup with Ms. Can Back PA-C, on 2019. She was put  on auto-BiPAP with a maximum IPAP of 14, minimum EPAP of 6, maximum  pressure support of 3, and minimum pressure support of 1 with Bi-Flex of  2. With the above auto-BiPAP settings, the patient was still found to  have a residual apnea-hypopnea index of 13.6. Due to high suspicion for  tramadol sleep apnea, the patient scheduled for in-lab titration. The  had echocardiogram performed on 2019. The patient found to have  an ejection fraction of 60% with a grade 1 diastolic dysfunction.     METHODS:  The patient underwent digital polysomnography in compliance  with the standards and specifications from the AASM Manual including the  simultaneous recording of 3 EEG channels (F4-M1, C4-M1, and O2-M1 with  back up electrodes F3-M2, C3-M2, and O1-M2), 2 EOG channels (E1-M2, and  E2-M1,), EMG (chin, left & right leg), EKG, Nonin pulse oximetry with   less than 2 second averaging time, body position, airflow recorded by  oral-nasal thermal sensor and nasal air pressure transducer, plus  respiratory effort recorded by calibrated respiratory inductance  plethysmography (RIP), flow volume loop, sound and video. Sleep staging  and scoring followed the standard put forth by the American Academy of  Sleep Medicine and utilized the 4A obstructive hypopnea event  desaturation of 4 percent or greater. INTERPRETATION:  This is a BiPAP retitration study and the study was  performed on 04/22/2019. The study was started at 09:41 p.m. and was  terminated at 05:13 a.m. with a total recording time of 451.8 minutes  and the sleep period time was 446.4 minutes. Total sleep time was 316.9  minutes and overall sleep efficiency was 70.1%. The sleep onset latency  was 5.4 minutes, wake after sleep onset was 129.5 minutes, and the REM  sleep latency was 196.5 minutes. SLEEP STAGING AND DISTRIBUTION SUMMARY:  Revealed the patient spent 16  minutes in stage I consisting of 5%, 243.4 minutes in stage II  consisting of 76.8%, 10.5 minutes in stage III consisting of 3.3%, 47  minutes in REM sleep consisting of 14.8% of total sleep time. BIPAP RETITRATION STUDY:  The BiPAP retitration was started with a BiPAP  pressure of 10/6 cm of water. The BiPAP pressure was gradually  increased to a final BiPAP pressure of 18/14 cm of water. At a final  BiPAP pressure of 18/14 cm of water, the patient spent 3 hours 7 minutes  in bed. Out of 3 hours 7 minutes, the patient slept for a period of  30.3 minutes in REM sleep and 1 hour 54 minutes in non-REM sleep. The  patient was found to have a total of four obstructive apneas and one  obstructive hypopnea event at a final BiPAP pressure of 18/14 cm of  water. At a final BiPAP pressure of 18/14 cm of water, the patient had  an apnea-hypopnea index of 2.1. The maximum oxygen desaturation  recorded at this pressure was 93% with a mean oxygen saturation of  96.6%. PERIODIC LIMB MOVEMENT ANALYSIS:  Revealed the patient did not have any  periodic limb movements.   The patient had a total of 51 spontaneous  arousals with a spontaneous arousal index of 9.7. EKG MONITORING:  Revealed normal sinus rhythm. IMPRESSION:  1. Moderately severe obstructive sleep apnea. The patient had optimal  retitration to a BiPAP pressure of 18/14 cm of water. 2.  Coronary artery disease. 3.  Essential hypertension. RECOMMENDATIONS:  1. For the patient's sleep-disordered breathing, we recommended  adjusting the patient's current BiPAP settings to a fixed BiPAP pressure  of 18/14 cm of water. 2.  Specific recommendations include the patient's choice of interface  was AirFit small-size full face mask. 3.  The patient should be scheduled for a followup with Ms. Domingo Quintana PA-C, clinic in six to eight weeks on recommended BiPAP  pressures for clinical re-evaluation with review of download. Thanks to Ms. Domingo Quintana PA-C, for giving me this opportunity  to participate in the care of this pleasant lady.         Judy Doherty MD    D: 04/23/2019 19:32:48       T: 04/23/2019 22:52:40     [email or web address]  Job#: [id number]     Doc#: [id number]    CC:

## 2019-04-25 ENCOUNTER — TELEPHONE (OUTPATIENT)
Dept: SLEEP CENTER | Age: 84
End: 2019-04-25

## 2019-04-25 NOTE — TELEPHONE ENCOUNTER
Faxed BiPAP setup order to [address] [place] [address]- [facility]  per patient's request.      Armand Michelle  4/25/2019

## 2019-05-15 ENCOUNTER — OFFICE VISIT (OUTPATIENT)
Dept: CARDIOLOGY CLINIC | Age: 84
End: 2019-05-15
Payer: MEDICARE

## 2019-05-15 VITALS
HEART RATE: 69 BPM | DIASTOLIC BLOOD PRESSURE: 66 MMHG | SYSTOLIC BLOOD PRESSURE: 120 MMHG | BODY MASS INDEX: 24.48 KG/M2 | HEIGHT: 62 IN | WEIGHT: 133 LBS

## 2019-05-15 DIAGNOSIS — I25.10 CORONARY ARTERY DISEASE INVOLVING NATIVE CORONARY ARTERY OF NATIVE HEART WITHOUT ANGINA PECTORIS: Primary | ICD-10-CM

## 2019-05-15 DIAGNOSIS — Q21.0 VSD (VENTRICULAR SEPTAL DEFECT AND AORTIC ARCH HYPOPLASIA: ICD-10-CM

## 2019-05-15 DIAGNOSIS — E78.00 PURE HYPERCHOLESTEROLEMIA: ICD-10-CM

## 2019-05-15 DIAGNOSIS — Q25.42 VSD (VENTRICULAR SEPTAL DEFECT AND AORTIC ARCH HYPOPLASIA: ICD-10-CM

## 2019-05-15 PROCEDURE — 1123F ACP DISCUSS/DSCN MKR DOCD: CPT | Performed by: NUCLEAR MEDICINE

## 2019-05-15 PROCEDURE — 4040F PNEUMOC VAC/ADMIN/RCVD: CPT | Performed by: NUCLEAR MEDICINE

## 2019-05-15 PROCEDURE — G8420 CALC BMI NORM PARAMETERS: HCPCS | Performed by: NUCLEAR MEDICINE

## 2019-05-15 PROCEDURE — 1036F TOBACCO NON-USER: CPT | Performed by: NUCLEAR MEDICINE

## 2019-05-15 PROCEDURE — 99213 OFFICE O/P EST LOW 20 MIN: CPT | Performed by: NUCLEAR MEDICINE

## 2019-05-15 PROCEDURE — G8400 PT W/DXA NO RESULTS DOC: HCPCS | Performed by: NUCLEAR MEDICINE

## 2019-05-15 PROCEDURE — 1090F PRES/ABSN URINE INCON ASSESS: CPT | Performed by: NUCLEAR MEDICINE

## 2019-05-15 PROCEDURE — 93000 ELECTROCARDIOGRAM COMPLETE: CPT | Performed by: NUCLEAR MEDICINE

## 2019-05-15 PROCEDURE — G8428 CUR MEDS NOT DOCUMENT: HCPCS | Performed by: NUCLEAR MEDICINE

## 2019-05-15 PROCEDURE — G8599 NO ASA/ANTIPLAT THER USE RNG: HCPCS | Performed by: NUCLEAR MEDICINE

## 2019-05-15 NOTE — PROGRESS NOTES
[address]  [address]  [facility] [address]  Dept: [phone]  Dept Fax: [phone]  Loc: [phone]    Visit Date: 5/15/2019    Kayden Milian is a 81 y.o. female who presents todayfor:  Chief Complaint   Patient presents with    Coronary Artery Disease    Hypertension    Ventricular Septal Defect   known small VSD   And mild CAD  Cath few years back   No chest pain   Does have a baseline dyspnea  No syncope  BP is stable        HPI:  HPI  Past Medical History:   Diagnosis Date    Arthritis     CAD (coronary artery disease)     Essential hypertension     Heart murmur     Hyperlipidemia     Sleep apnea     Thyroid disease     Hypothyroid    VSD (ventricular septal defect)       Past Surgical History:   Procedure Laterality Date    BLADDER SUSPENSION       SECTION      x2    COLONOSCOPY      DILATATION, ESOPHAGUS      DOPPLER ECHOCARDIOGRAPHY  10-01-10    EF 45-50%    EYE SURGERY Bilateral     cataracts    HYSTERECTOMY      KNEE CARTILAGE SURGERY Right     OTHER SURGICAL HISTORY  2015    VAGINAL ANTERIOR AND POSTERIOR REPAIR , SOLYX BLADDER SLING, CYSTOSCOPY    SHOULDER SURGERY Right     SINUS SURGERY      TUBAL LIGATION      VENTRICULOPERITONEAL SHUNT Right 10/11/2016    Dr Perla Ojeda     Family History   Problem Relation Age of Onset    Cancer Sister     Heart Disease Sister      Social History     Tobacco Use    Smoking status: Never Smoker    Smokeless tobacco: Never Used   Substance Use Topics    Alcohol use: No     Alcohol/week: 0.0 oz      Current Outpatient Medications   Medication Sig Dispense Refill    CPAP Machine MISC by Does not apply route Please change Max IPAP 14 EPAP min 6 and PS min 1 and PS max 3 cm H20. 1 each 0    Probiotic Product (PROBIOTIC DAILY PO) Take by mouth daily      Multiple Vitamin (MULTI VITAMIN PO) Take by mouth daily      CALCIUM PO Take by mouth daily  metroNIDAZOLE (METROCREAM) 0.75 % cream daily  0    levothyroxine (SYNTHROID) 112 MCG tablet Take 112 mcg by mouth Daily.  simvastatin (ZOCOR) 10 MG tablet Take 10 mg by mouth nightly. No current facility-administered medications for this visit. Allergies   Allergen Reactions    Iv Dye [Iodides]     Sulfa Antibiotics Hives    Iodine Rash     blisters     Health Maintenance   Topic Date Due    DTaP/Tdap/Td vaccine (1 - Tdap) 06/06/1955    Shingles Vaccine (1 of 2) 06/06/1986    DEXA (modify frequency per FRAX score)  06/06/2001    Pneumococcal 65+ years Vaccine (1 of 2 - PCV13) 06/06/2001    TSH testing  12/12/2015    Potassium monitoring  05/04/2019    Creatinine monitoring  05/04/2019    Flu vaccine (Season Ended) 09/01/2019       Subjective:  Review of Systems  General:   No fever, no chills, No fatigue or weight loss  Pulmonary:    No dyspnea, no wheezing  Cardiac:    Denies recent chest pain,   GI:     No nausea or vomiting, no abdominal pain  Neuro:    No dizziness or light headedness,   Musculoskeletal:  No recent active issues  Extremities:   No edema, good peripheral pulses      Objective:  Physical Exam  /66   Pulse 69   Ht 5' 2\" (1.575 m)   Wt 133 lb (60.3 kg)   BMI 24.33 kg/m²   General:   Well developed, well nourished  Lungs:   Clear to auscultation  Heart:    Normal S1 S2, Slight murmur. no rubs, no gallops  Abdomen:   Soft, non tender, no organomegalies, positive bowel sounds  Extremities:   No edema, no cyanosis, good peripheral pulses  Neurological:   Awake, alert, oriented. No obvious focal deficits  Musculoskelatal:  No obvious deformities    Assessment:      Diagnosis Orders   1. Coronary artery disease involving native coronary artery of native heart without angina pectoris  EKG 12 lead   2. Pure hypercholesterolemia  EKG 12 lead   3. VSD (ventricular septal defect and aortic arch hypoplasia     cardiac fair for now  ECG in office was done today.  I reviewed the ECG. No acute findings    Plan:  No follow-ups on file. As above  Continue risk factor modification and medical management  Thank you for allowing me to participate in the care of your patient. Please don't hesitate to contact me regarding any further issues related to the patient care    Orders Placed:  Orders Placed This Encounter   Procedures    EKG 12 lead     Order Specific Question:   Reason for Exam?     Answer: Other       Medications Prescribed:  No orders of the defined types were placed in this encounter. Discussed use, benefit, and side effects of prescribed medications. All patient questions answered. Pt voicedunderstanding. Instructed to continue current medications, diet and exercise. Continue risk factor modification and medical management. Patient agreed with treatment plan. Follow up as directed.     Electronically signedby Bernard Álvarez MD on 5/15/2019 at 10:01 AM

## 2019-06-18 ENCOUNTER — OFFICE VISIT (OUTPATIENT)
Dept: PULMONOLOGY | Age: 84
End: 2019-06-18
Payer: MEDICARE

## 2019-06-18 VITALS
BODY MASS INDEX: 24.59 KG/M2 | WEIGHT: 133.6 LBS | HEART RATE: 64 BPM | SYSTOLIC BLOOD PRESSURE: 124 MMHG | DIASTOLIC BLOOD PRESSURE: 68 MMHG | HEIGHT: 62 IN | OXYGEN SATURATION: 99 %

## 2019-06-18 DIAGNOSIS — Q21.0 VSD (VENTRICULAR SEPTAL DEFECT): ICD-10-CM

## 2019-06-18 DIAGNOSIS — G47.31 COMPLEX SLEEP APNEA SYNDROME: Primary | ICD-10-CM

## 2019-06-18 PROCEDURE — G8599 NO ASA/ANTIPLAT THER USE RNG: HCPCS | Performed by: PHYSICIAN ASSISTANT

## 2019-06-18 PROCEDURE — 99213 OFFICE O/P EST LOW 20 MIN: CPT | Performed by: PHYSICIAN ASSISTANT

## 2019-06-18 PROCEDURE — 4040F PNEUMOC VAC/ADMIN/RCVD: CPT | Performed by: PHYSICIAN ASSISTANT

## 2019-06-18 PROCEDURE — G8400 PT W/DXA NO RESULTS DOC: HCPCS | Performed by: PHYSICIAN ASSISTANT

## 2019-06-18 PROCEDURE — 1090F PRES/ABSN URINE INCON ASSESS: CPT | Performed by: PHYSICIAN ASSISTANT

## 2019-06-18 PROCEDURE — G8427 DOCREV CUR MEDS BY ELIG CLIN: HCPCS | Performed by: PHYSICIAN ASSISTANT

## 2019-06-18 PROCEDURE — 1036F TOBACCO NON-USER: CPT | Performed by: PHYSICIAN ASSISTANT

## 2019-06-18 PROCEDURE — G8420 CALC BMI NORM PARAMETERS: HCPCS | Performed by: PHYSICIAN ASSISTANT

## 2019-06-18 PROCEDURE — 1123F ACP DISCUSS/DSCN MKR DOCD: CPT | Performed by: PHYSICIAN ASSISTANT

## 2019-06-18 ASSESSMENT — ENCOUNTER SYMPTOMS
NAUSEA: 0
ALLERGIC/IMMUNOLOGIC NEGATIVE: 1
SHORTNESS OF BREATH: 0
COUGH: 0
DIARRHEA: 0
STRIDOR: 0
WHEEZING: 0
EYES NEGATIVE: 1
CHEST TIGHTNESS: 0
BACK PAIN: 0

## 2019-06-18 NOTE — PROGRESS NOTES
[facility]      Michael Morse         [id number]  2019   Chief Complaint   Patient presents with    Follow-up     8 week MISAEL with [facility] download        Pt of Dr. Mary Alice Guajardo     PAP Download:   Original or initialAHI: 50.3     Date of initial study: 13      Compliant  100%     Noncompliant 0 %     PAP Type auto  Level  14/18 cmh2o    Avg Hrs/Day 7:6  AHI: 9.8   Recorded compliance dates , 19-19   Machine/Mfg: respironics Interface: ffm    Provider:    _[email or web address]           Araseli Valle        __ Dede Umanzor    __ Ali Graec            __[facility] __Adaptive   __[place]:       __Other    Neck Size: 14  Mallampati Mallampati 3  ESS:  15  SAQLI: 85    Here is a scan of the most recent download:              Presentation:   Davon Reynoso presents for sleep medicine follow up for obstructive sleep apnea  Since the last visit, Davon Reynoso is doing well with PAP. She had a re-titration. Her AHI has been very difficult to control. She had a re-titration after failing multiple pressure changes and auto bipap. Her AHI is still elevated but the best its been. Her mask leaks occ but the best seal she has had. Equipment issues: The pressure is  acceptable, the mask is acceptable and she  is  using the humidity. Sleep issues:  Do you feel better? Yes  More rested? Yes   Better concentration? yes    Progress History:   Since last visit any new medical issues? No  New ER or hospitlal visits? No  Any new or changes in medicines? No  Any new sleep medicines?  No        Past Medical History:   Diagnosis Date    Arthritis     CAD (coronary artery disease)     Essential hypertension     Heart murmur     Hyperlipidemia     Sleep apnea     Thyroid disease     Hypothyroid    VSD (ventricular septal defect)        Past Surgical History:   Procedure Laterality Date    BLADDER SUSPENSION  2011     SECTION      x2    COLONOSCOPY      DILATATION, ESOPHAGUS      DOPPLER ECHOCARDIOGRAPHY  10-01-10    EF 45-50%    EYE SURGERY Bilateral     cataracts    HYSTERECTOMY  1990    KNEE CARTILAGE SURGERY Right     OTHER SURGICAL HISTORY  6/1/2015    VAGINAL ANTERIOR AND POSTERIOR REPAIR , SOLYX BLADDER SLING, CYSTOSCOPY    SHOULDER SURGERY Right     SINUS SURGERY      TUBAL LIGATION  1962    VENTRICULOPERITONEAL SHUNT Right 10/11/2016    Dr Isac Thompson       Social History     Tobacco Use    Smoking status: Never Smoker    Smokeless tobacco: Never Used   Substance Use Topics    Alcohol use: No     Alcohol/week: 0.0 oz    Drug use: No       Allergies   Allergen Reactions    Iv Dye [Iodides]     Sulfa Antibiotics Hives    Iodine Rash     blisters       Current Outpatient Medications   Medication Sig Dispense Refill    CPAP Machine MISC by Does not apply route Please change Max IPAP 14 EPAP min 6 and PS min 1 and PS max 3 cm H20. 1 each 0    Probiotic Product (PROBIOTIC DAILY PO) Take by mouth daily      Multiple Vitamin (MULTI VITAMIN PO) Take by mouth daily      CALCIUM PO Take by mouth daily      metroNIDAZOLE (METROCREAM) 0.75 % cream daily  0    levothyroxine (SYNTHROID) 112 MCG tablet Take 112 mcg by mouth Daily.  simvastatin (ZOCOR) 10 MG tablet Take 10 mg by mouth nightly. No current facility-administered medications for this visit. Family History   Problem Relation Age of Onset    Cancer Sister     Heart Disease Sister         Review of Systems -   Review of Systems   Constitutional: Negative for activity change, appetite change, chills and fever. HENT: Negative for congestion and postnasal drip. Eyes: Negative. Respiratory: Negative for cough, chest tightness, shortness of breath, wheezing and stridor. Cardiovascular: Negative for chest pain and leg swelling. Gastrointestinal: Negative for diarrhea and nausea. Endocrine: Negative. Genitourinary: Negative. Musculoskeletal: Negative. Negative for arthralgias and back pain. Skin: Negative. Allergic/Immunologic: Negative. Neurological: Negative. Negative for dizziness and light-headedness. Psychiatric/Behavioral: Negative. All other systems reviewed and are negative. Physical Exam:    BMI:  Body mass index is 24.44 kg/m². Wt Readings from Last 3 Encounters:   06/18/19 133 lb 9.6 oz (60.6 kg)   05/15/19 133 lb (60.3 kg)   03/12/19 130 lb (59 kg)     Weight stable / unchanged  Vitals: /68 (Site: Left Upper Arm, Position: Sitting)   Pulse 64   Ht 5' 2\" (1.575 m)   Wt 133 lb 9.6 oz (60.6 kg)   SpO2 99% Comment: on RA  BMI 24.44 kg/m²       Physical Exam   Constitutional: She is oriented to person, place, and time. She appears well-developed and well-nourished. HENT:   Head: Normocephalic and atraumatic. Right Ear: External ear normal.   Left Ear: External ear normal.   Mouth/Throat: Oropharynx is clear and moist.   Eyes: Pupils are equal, round, and reactive to light. Conjunctivae and EOM are normal.   Neck: Normal range of motion. Neck supple. Cardiovascular: Normal rate, regular rhythm and normal heart sounds. Pulmonary/Chest: Effort normal and breath sounds normal.   Abdominal: Soft. Musculoskeletal: Normal range of motion. Neurological: She is alert and oriented to person, place, and time. Skin: Skin is warm and dry. Psychiatric: She has a normal mood and affect. Her behavior is normal. Judgment and thought content normal.         ASSESSMENT/DIAGNOSIS     Diagnosis Orders   1. Complex sleep apnea syndrome     2. VSD (ventricular septal defect)              Plan   Do you need any equipment today? No  - This is the best her AHI has been  - She  was advised to continue current positive airway pressure therapy with above described pressure.    - She  advised to keep goodcompliance with current recommended pressure to get optimal results and clinical improvement  - Recommend 7-9 hours of sleep with PAP  - She was advised to call DME company regarding supplies if needed.   -She call my office for earlier appointment if needed for worsening of sleep symptoms.   - She was instructed on weight loss  - Pattie Buchanan was educated about my impression and plan. Patient verbalizesunderstanding.   We will see Tiffani Necessary back in: 1 year with download    Information added by my medical assistant/LPN was reviewed today         Monisha Simpson PA-C, MPAS  6/18/2019

## 2019-07-31 ENCOUNTER — HOSPITAL ENCOUNTER (OUTPATIENT)
Dept: CT IMAGING | Age: 84
Discharge: HOME | End: 2019-07-31
Payer: MEDICARE

## 2019-07-31 DIAGNOSIS — G91.2 (IDIOPATHIC) NORMAL PRESSURE HYDROCEPHALUS (HCC): ICD-10-CM

## 2019-07-31 DIAGNOSIS — G44.52 HEADACHE, NEW DAILY PERSISTENT (NDPH): ICD-10-CM

## 2019-07-31 PROCEDURE — 70450 CT HEAD/BRAIN W/O DYE: CPT

## 2019-09-24 ENCOUNTER — HOSPITAL ENCOUNTER (EMERGENCY)
Age: 84
Discharge: HOME | End: 2019-09-24
Payer: MEDICARE

## 2019-09-24 VITALS
BODY MASS INDEX: 24.51 KG/M2 | RESPIRATION RATE: 16 BRPM | WEIGHT: 134 LBS | HEART RATE: 70 BPM | DIASTOLIC BLOOD PRESSURE: 70 MMHG | TEMPERATURE: 98.1 F | OXYGEN SATURATION: 97 % | SYSTOLIC BLOOD PRESSURE: 153 MMHG
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
## 2019-09-24 DIAGNOSIS — Z23 NEED FOR DIPHTHERIA-TETANUS-PERTUSSIS (TDAP) VACCINE: Primary | ICD-10-CM

## 2019-09-24 PROCEDURE — 6360000002 HC RX W HCPCS: Performed by: NURSE PRACTITIONER

## 2019-09-24 PROCEDURE — 99213 OFFICE O/P EST LOW 20 MIN: CPT | Performed by: NURSE PRACTITIONER

## 2019-09-24 PROCEDURE — 99212 OFFICE O/P EST SF 10 MIN: CPT

## 2019-09-24 PROCEDURE — 2709999900 HC NON-CHARGEABLE SUPPLY

## 2019-09-24 PROCEDURE — 90471 IMMUNIZATION ADMIN: CPT | Performed by: NURSE PRACTITIONER

## 2019-09-24 PROCEDURE — 90715 TDAP VACCINE 7 YRS/> IM: CPT | Performed by: NURSE PRACTITIONER

## 2019-09-24 RX ORDER — AMOXICILLIN AND CLAVULANATE POTASSIUM 875; 125 MG/1; MG/1
1 TABLET, FILM COATED ORAL 2 TIMES DAILY
Qty: 10 TABLET | Refills: 0 | Status: SHIPPED | OUTPATIENT
Start: 2019-09-24 | End: 2019-09-29

## 2019-09-24 RX ADMIN — TETANUS TOXOID, REDUCED DIPHTHERIA TOXOID AND ACELLULAR PERTUSSIS VACCINE, ADSORBED 0.5 ML: 5; 2.5; 8; 8; 2.5 SUSPENSION INTRAMUSCULAR at 12:35

## 2019-09-24 ASSESSMENT — PAIN SCALES - GENERAL: PAINLEVEL_OUTOF10: 2

## 2019-09-24 ASSESSMENT — PAIN DESCRIPTION - PAIN TYPE: TYPE: ACUTE PAIN

## 2019-09-24 ASSESSMENT — PAIN DESCRIPTION - FREQUENCY: FREQUENCY: INTERMITTENT

## 2019-09-24 ASSESSMENT — PAIN DESCRIPTION - ORIENTATION: ORIENTATION: RIGHT

## 2019-09-24 ASSESSMENT — ENCOUNTER SYMPTOMS
GASTROINTESTINAL NEGATIVE: 1
COLOR CHANGE: 0

## 2019-09-24 ASSESSMENT — PAIN DESCRIPTION - DESCRIPTORS: DESCRIPTORS: TENDER

## 2019-09-24 ASSESSMENT — PAIN DESCRIPTION - LOCATION: LOCATION: HAND

## 2019-09-24 NOTE — ED PROVIDER NOTES
distress. Musculoskeletal:        Right knee: She exhibits normal range of motion. Left knee: She exhibits normal range of motion. Right hand: She exhibits tenderness (dorsal aspect around the abrasion when she bumps it.  ). She exhibits normal range of motion, no bony tenderness, normal capillary refill, no deformity, no laceration and no swelling. Normal sensation noted. Normal strength noted. Neurological: She is alert and oriented to person, place, and time. No sensory deficit. Skin: Skin is warm and dry. Capillary refill takes less than 2 seconds. Abrasion (superficial abrasion to dorsal aspect of right hand, no signs of infection, no redness, warmth, no drainage.  ) noted. No bruising, no ecchymosis, no petechiae and no rash noted. She is not diaphoretic. No cyanosis or erythema. No pallor. Psychiatric: She has a normal mood and affect. Her behavior is normal.   Nursing note and vitals reviewed. DIAGNOSTIC RESULTS   Labs:No results found for this visit on 09/24/19. IMAGING:    URGENT CARE COURSE:     Vitals:    09/24/19 1204   BP: (!) 153/70   Pulse: 70   Resp: 16   Temp: 98.1 °F (36.7 °C)   TempSrc: Oral   SpO2: 97%   Weight: 134 lb (60.8 kg)       Medications   Tetanus-Diphth-Acell Pertussis (BOOSTRIX) injection 0.5 mL (0.5 mLs Intramuscular Given 9/24/19 1235)     PROCEDURES:  None  FINAL IMPRESSION       1. Need for diphtheria-tetanus-pertussis (Tdap) vaccine    2.  Dog scratch        DISPOSITION/PLAN   DISPOSITION Decision To Discharge 09/24/2019 12:32:28 PM  tdap administered per request  Keep hand abrasion clean with soap and water, pat dry, leave open to air  Monitor for redness, swelling, warmth, streaking, drainage, fevers make appointment or go to nearest urgent care for evaluation      PATIENT REFERRED TO:  Yumiko Damon MD  [address] [place], [place] [address] [phone]    Schedule an appointment as soon as possible for a visit in 2

## 2020-05-20 ENCOUNTER — OFFICE VISIT (OUTPATIENT)
Dept: CARDIOLOGY CLINIC | Age: 85
End: 2020-05-20
Payer: MEDICARE

## 2020-05-20 VITALS
HEIGHT: 62 IN | WEIGHT: 132.8 LBS | DIASTOLIC BLOOD PRESSURE: 68 MMHG | SYSTOLIC BLOOD PRESSURE: 138 MMHG | HEART RATE: 76 BPM | BODY MASS INDEX: 24.44 KG/M2

## 2020-05-20 PROCEDURE — 93000 ELECTROCARDIOGRAM COMPLETE: CPT | Performed by: NUCLEAR MEDICINE

## 2020-05-20 PROCEDURE — G8400 PT W/DXA NO RESULTS DOC: HCPCS | Performed by: NUCLEAR MEDICINE

## 2020-05-20 PROCEDURE — G8427 DOCREV CUR MEDS BY ELIG CLIN: HCPCS | Performed by: NUCLEAR MEDICINE

## 2020-05-20 PROCEDURE — 1123F ACP DISCUSS/DSCN MKR DOCD: CPT | Performed by: NUCLEAR MEDICINE

## 2020-05-20 PROCEDURE — G8420 CALC BMI NORM PARAMETERS: HCPCS | Performed by: NUCLEAR MEDICINE

## 2020-05-20 PROCEDURE — 99213 OFFICE O/P EST LOW 20 MIN: CPT | Performed by: NUCLEAR MEDICINE

## 2020-05-20 PROCEDURE — 1090F PRES/ABSN URINE INCON ASSESS: CPT | Performed by: NUCLEAR MEDICINE

## 2020-05-20 PROCEDURE — 1036F TOBACCO NON-USER: CPT | Performed by: NUCLEAR MEDICINE

## 2020-05-20 PROCEDURE — 4040F PNEUMOC VAC/ADMIN/RCVD: CPT | Performed by: NUCLEAR MEDICINE

## 2020-05-20 NOTE — PROGRESS NOTES
[facility],[place] [id number]  Dept: [phone]  Dept Fax: [phone]  Loc: [phone]    Visit Date: 2020    Adriana Rush is a 81 y.o. female who presents todayfor:  Chief Complaint   Patient presents with    Check-Up    Coronary Artery Disease    Ventricular Septal Defect     Known chronic VSD  Known mild CAD  Cath a while  Back   No chest pain   No changes in breathing  No dizziness  No syncope  No new issues      HPI:  HPI  Past Medical History:   Diagnosis Date    Arthritis     CAD (coronary artery disease)     Heart murmur     Hyperlipidemia     Sleep apnea     Thyroid disease     Hypothyroid    VSD (ventricular septal defect)       Past Surgical History:   Procedure Laterality Date    BLADDER SUSPENSION       SECTION      x2    COLONOSCOPY      DILATATION, ESOPHAGUS      DOPPLER ECHOCARDIOGRAPHY  10-01-10    EF 45-50%    EYE SURGERY Bilateral     cataracts    HYSTERECTOMY      KNEE CARTILAGE SURGERY Right     OTHER SURGICAL HISTORY  2015    VAGINAL ANTERIOR AND POSTERIOR REPAIR , SOLYX BLADDER SLING, CYSTOSCOPY    SHOULDER SURGERY Right     SINUS SURGERY      TUBAL LIGATION      VENTRICULOPERITONEAL SHUNT Right 10/11/2016    Dr Ceasar Galan     Family History   Problem Relation Age of Onset    Cancer Sister     Heart Disease Sister      Social History     Tobacco Use    Smoking status: Never Smoker    Smokeless tobacco: Never Used   Substance Use Topics    Alcohol use: No     Alcohol/week: 0.0 standard drinks      Current Outpatient Medications   Medication Sig Dispense Refill    CPAP Machine MISC by Does not apply route Please change Max IPAP 14 EPAP min 6 and PS min 1 and PS max 3 cm H20. 1 each 0    Probiotic Product (PROBIOTIC DAILY PO) Take by mouth daily      Multiple Vitamin (MULTI VITAMIN PO) Take by mouth daily      CALCIUM PO Take by mouth daily      native heart without angina pectoris  EKG 12 Lead   2. VSD (ventricular septal defect and aortic arch hypoplasia     cardiac fair for now   ECG in office was done today. I reviewed the ECG. No acute findings      Plan:  No follow-ups on file. As above  Continue risk factor modification and medical management  Thank you for allowing me to participate in the care of your patient. Please don't hesitate to contact me regarding any further issues related to the patient care    Orders Placed:  Orders Placed This Encounter   Procedures    EKG 12 Lead     Order Specific Question:   Reason for Exam?     Answer: Other       Medications Prescribed:  No orders of the defined types were placed in this encounter. Discussed use, benefit, and side effects of prescribed medications. All patient questions answered. Pt voicedunderstanding. Instructed to continue current medications, diet and exercise. Continue risk factor modification and medical management. Patient agreed with treatment plan. Follow up as directed.     Electronically signedby Irineo English MD on 5/20/2020 at 10:10 AM

## 2020-06-24 ENCOUNTER — OFFICE VISIT (OUTPATIENT)
Dept: PULMONOLOGY | Age: 85
End: 2020-06-24
Payer: MEDICARE

## 2020-06-24 VITALS
TEMPERATURE: 97.6 F | BODY MASS INDEX: 24.33 KG/M2 | WEIGHT: 133 LBS | HEART RATE: 70 BPM | DIASTOLIC BLOOD PRESSURE: 60 MMHG | SYSTOLIC BLOOD PRESSURE: 128 MMHG | OXYGEN SATURATION: 98 %

## 2020-06-24 PROCEDURE — G8420 CALC BMI NORM PARAMETERS: HCPCS | Performed by: PHYSICIAN ASSISTANT

## 2020-06-24 PROCEDURE — 1090F PRES/ABSN URINE INCON ASSESS: CPT | Performed by: PHYSICIAN ASSISTANT

## 2020-06-24 PROCEDURE — 1036F TOBACCO NON-USER: CPT | Performed by: PHYSICIAN ASSISTANT

## 2020-06-24 PROCEDURE — 1123F ACP DISCUSS/DSCN MKR DOCD: CPT | Performed by: PHYSICIAN ASSISTANT

## 2020-06-24 PROCEDURE — G8400 PT W/DXA NO RESULTS DOC: HCPCS | Performed by: PHYSICIAN ASSISTANT

## 2020-06-24 PROCEDURE — G8427 DOCREV CUR MEDS BY ELIG CLIN: HCPCS | Performed by: PHYSICIAN ASSISTANT

## 2020-06-24 PROCEDURE — 99214 OFFICE O/P EST MOD 30 MIN: CPT | Performed by: PHYSICIAN ASSISTANT

## 2020-06-24 PROCEDURE — 4040F PNEUMOC VAC/ADMIN/RCVD: CPT | Performed by: PHYSICIAN ASSISTANT

## 2020-06-24 ASSESSMENT — ENCOUNTER SYMPTOMS
EYES NEGATIVE: 1
STRIDOR: 0
WHEEZING: 0
COUGH: 0
ALLERGIC/IMMUNOLOGIC NEGATIVE: 1
CHEST TIGHTNESS: 0
BACK PAIN: 0
SHORTNESS OF BREATH: 0
DIARRHEA: 0
NAUSEA: 0

## 2020-06-24 NOTE — PROGRESS NOTES
SURGERY Bilateral     cataracts    HYSTERECTOMY  1990    KNEE CARTILAGE SURGERY Right     OTHER SURGICAL HISTORY  6/1/2015    VAGINAL ANTERIOR AND POSTERIOR REPAIR , SOLYX BLADDER SLING, CYSTOSCOPY    SHOULDER SURGERY Right     SINUS SURGERY      TUBAL LIGATION  1962    VENTRICULOPERITONEAL SHUNT Right 10/11/2016    Dr Luiza Claros       Social History     Tobacco Use    Smoking status: Never Smoker    Smokeless tobacco: Never Used   Substance Use Topics    Alcohol use: No     Alcohol/week: 0.0 standard drinks    Drug use: No       Allergies   Allergen Reactions    Iv Dye [Iodides]     Sulfa Antibiotics Hives    Iodine Rash     blisters       Current Outpatient Medications   Medication Sig Dispense Refill    CPAP Machine MISC by Does not apply route Please change Max IPAP 14 EPAP min 6 and PS min 1 and PS max 3 cm H20. 1 each 0    Probiotic Product (PROBIOTIC DAILY PO) Take by mouth daily      Multiple Vitamin (MULTI VITAMIN PO) Take by mouth daily      CALCIUM PO Take by mouth daily      metroNIDAZOLE (METROCREAM) 0.75 % cream daily  0    levothyroxine (SYNTHROID) 112 MCG tablet Take 112 mcg by mouth Daily.  simvastatin (ZOCOR) 10 MG tablet Take 10 mg by mouth nightly. No current facility-administered medications for this visit. Family History   Problem Relation Age of Onset    Cancer Sister     Heart Disease Sister         Review of Systems -   Review of Systems   Constitutional: Negative for activity change, appetite change, chills and fever. HENT: Negative for congestion and postnasal drip. Eyes: Negative. Respiratory: Negative for cough, chest tightness, shortness of breath, wheezing and stridor. Cardiovascular: Negative for chest pain and leg swelling. Gastrointestinal: Negative for diarrhea and nausea. Endocrine: Negative. Genitourinary: Negative. Musculoskeletal: Negative. Negative for arthralgias and back pain. Skin: Negative.

## 2020-10-28 ENCOUNTER — OFFICE VISIT (OUTPATIENT)
Dept: PULMONOLOGY | Age: 85
End: 2020-10-28
Payer: MEDICARE

## 2020-10-28 VITALS
HEIGHT: 62 IN | TEMPERATURE: 97.7 F | HEART RATE: 64 BPM | BODY MASS INDEX: 24.84 KG/M2 | WEIGHT: 135 LBS | SYSTOLIC BLOOD PRESSURE: 130 MMHG | DIASTOLIC BLOOD PRESSURE: 64 MMHG | OXYGEN SATURATION: 99 %

## 2020-10-28 PROCEDURE — G8484 FLU IMMUNIZE NO ADMIN: HCPCS | Performed by: PHYSICIAN ASSISTANT

## 2020-10-28 PROCEDURE — 99214 OFFICE O/P EST MOD 30 MIN: CPT | Performed by: PHYSICIAN ASSISTANT

## 2020-10-28 PROCEDURE — 1123F ACP DISCUSS/DSCN MKR DOCD: CPT | Performed by: PHYSICIAN ASSISTANT

## 2020-10-28 PROCEDURE — G8400 PT W/DXA NO RESULTS DOC: HCPCS | Performed by: PHYSICIAN ASSISTANT

## 2020-10-28 PROCEDURE — 1036F TOBACCO NON-USER: CPT | Performed by: PHYSICIAN ASSISTANT

## 2020-10-28 PROCEDURE — 1090F PRES/ABSN URINE INCON ASSESS: CPT | Performed by: PHYSICIAN ASSISTANT

## 2020-10-28 PROCEDURE — 4040F PNEUMOC VAC/ADMIN/RCVD: CPT | Performed by: PHYSICIAN ASSISTANT

## 2020-10-28 PROCEDURE — G8420 CALC BMI NORM PARAMETERS: HCPCS | Performed by: PHYSICIAN ASSISTANT

## 2020-10-28 PROCEDURE — G8427 DOCREV CUR MEDS BY ELIG CLIN: HCPCS | Performed by: PHYSICIAN ASSISTANT

## 2020-10-28 ASSESSMENT — ENCOUNTER SYMPTOMS
NAUSEA: 0
EYES NEGATIVE: 1
SHORTNESS OF BREATH: 0
COUGH: 0
WHEEZING: 0
BACK PAIN: 0
DIARRHEA: 0
CHEST TIGHTNESS: 0
STRIDOR: 0
ALLERGIC/IMMUNOLOGIC NEGATIVE: 1

## 2020-10-28 NOTE — PROGRESS NOTES
[facility]      Tabatha Castaneda         [id number]  10/28/2020   Chief Complaint   Patient presents with    Follow-up     4 mo f/u with mask refit        Pt of Dr. Sandra HOPPER Download:   Original or initial AHI: 50.3     Date of initial study: 13      Compliant  96.7%     Noncompliant 3.3 %     PAP Type BiPAP Level  14/18   Avg Hrs/Day 6 hr 33 min  AHI: 10.2   Recorded compliance dates ,   20-10/27/20   Machine/Mfg:   [] ResMed    [x] Respironics/Dreamstation   Interface:   [] Nasal    [] Nasal pillows   [x] FFM      Provider:      [x] [facility]     [][employer]     [] [employer]    [] [address]    [] [place]               [] [facility]      [] [employer]    [] [facility]:      [] Other    Neck Size: 14  Mallampati Mallampati 3  ESS:  17  SAQLI: 76    Here is a scan of the most recent download:                  Presentation:   Viky Shetty presents for sleep medicine follow up for obstructive sleep apnea  Since the last visit, Viky Shetty is wearing PAP well. AHI still elevated, mostly obstructive events. Mask is fitting better. She is sleeping better and feels more rested. Equipment issues: The pressure is  acceptable, the mask is acceptable     Sleep issues:  Do you feel better? Yes  More rested? Yes   Better concentration? yes    Progress History:   Since last visit any new medical issues? No  New ER or hospitlal visits? No  Any new or changes in medicines? No  Any new sleep medicines?  No        Past Medical History:   Diagnosis Date    Arthritis     CAD (coronary artery disease)     Heart murmur     Hyperlipidemia     Sleep apnea     Thyroid disease     Hypothyroid    VSD (ventricular septal defect)        Past Surgical History:   Procedure Laterality Date    BLADDER SUSPENSION  2011     SECTION      x2    COLONOSCOPY      DILATATION, ESOPHAGUS      DOPPLER ECHOCARDIOGRAPHY  10-01-10    EF 45-50%    EYE SURGERY Bilateral     cataracts    HYSTERECTOMY  1990    KNEE CARTILAGE SURGERY Right     OTHER SURGICAL HISTORY  6/1/2015    VAGINAL ANTERIOR AND POSTERIOR REPAIR , SOLYX BLADDER SLING, CYSTOSCOPY    SHOULDER SURGERY Right     SINUS SURGERY      TUBAL LIGATION  1962    VENTRICULOPERITONEAL SHUNT Right 10/11/2016    Dr Loida Glass       Social History     Tobacco Use    Smoking status: Never Smoker    Smokeless tobacco: Never Used   Substance Use Topics    Alcohol use: No     Alcohol/week: 0.0 standard drinks    Drug use: No       Allergies   Allergen Reactions    Iv Dye [Iodides]     Sulfa Antibiotics Hives    Iodine Rash     blisters       Current Outpatient Medications   Medication Sig Dispense Refill    CPAP Machine MISC by Does not apply route Please change Max IPAP 14 EPAP min 6 and PS min 1 and PS max 3 cm H20. 1 each 0    Probiotic Product (PROBIOTIC DAILY PO) Take by mouth daily      Multiple Vitamin (MULTI VITAMIN PO) Take by mouth daily      CALCIUM PO Take by mouth daily      metroNIDAZOLE (METROCREAM) 0.75 % cream daily  0    levothyroxine (SYNTHROID) 112 MCG tablet Take 112 mcg by mouth Daily.  simvastatin (ZOCOR) 10 MG tablet Take 10 mg by mouth nightly. No current facility-administered medications for this visit. Family History   Problem Relation Age of Onset    Cancer Sister     Heart Disease Sister         Review of Systems -   Review of Systems   Constitutional: Negative for activity change, appetite change, chills and fever. HENT: Negative for congestion and postnasal drip. Eyes: Negative. Respiratory: Negative for cough, chest tightness, shortness of breath, wheezing and stridor. Cardiovascular: Negative for chest pain and leg swelling. Gastrointestinal: Negative for diarrhea and nausea. Endocrine: Negative. Genitourinary: Negative. Musculoskeletal: Positive for arthralgias. Negative for back pain. Skin: Negative. Allergic/Immunologic: Negative.     Neurological: Negative. Negative for dizziness and light-headedness. Psychiatric/Behavioral: Negative. All other systems reviewed and are negative. Physical Exam:    BMI:  Body mass index is 24.69 kg/m². Wt Readings from Last 3 Encounters:   10/28/20 135 lb (61.2 kg)   06/24/20 133 lb (60.3 kg)   05/20/20 132 lb 12.8 oz (60.2 kg)     Weight stable / unchanged  Vitals: /64 (Site: Left Upper Arm, Position: Sitting, Cuff Size: Medium Adult)   Pulse 64   Temp 97.7 °F (36.5 °C)   Ht 5' 2\" (1.575 m)   Wt 135 lb (61.2 kg)   SpO2 99% Comment: on room air  BMI 24.69 kg/m²       Physical Exam  Constitutional:       Appearance: She is well-developed. HENT:      Head: Normocephalic and atraumatic. Right Ear: External ear normal.      Left Ear: External ear normal.   Eyes:      Conjunctiva/sclera: Conjunctivae normal.      Pupils: Pupils are equal, round, and reactive to light. Neck:      Musculoskeletal: Normal range of motion and neck supple. Cardiovascular:      Rate and Rhythm: Normal rate and regular rhythm. Heart sounds: Normal heart sounds. Pulmonary:      Effort: Pulmonary effort is normal.      Breath sounds: Normal breath sounds. Musculoskeletal: Normal range of motion. Skin:     General: Skin is warm and dry. Neurological:      Mental Status: She is alert and oriented to person, place, and time. Psychiatric:         Behavior: Behavior normal.         Thought Content: Thought content normal.         Judgment: Judgment normal.           ASSESSMENT/DIAGNOSIS     Diagnosis Orders   1. Complex sleep apnea syndrome              Plan   Do you need any equipment today? No  - Will increase EPAP  - will accept higher AHI- always been elevated  - Download reviewed and discussed with patient  - She  was advised to continue current positive airway pressure therapy with above described pressure.    - She  advised to keep good compliance with current recommended pressure to get optimal results and clinical improvement  - Recommend 7-9 hours of sleep with PAP  - She was advised to call [employer] company regarding supplies if needed.   -She call my office for earlier appointment if needed for worsening of sleep symptoms.   - She was instructed on weight loss  - Kris Henry was educated about my impression and plan. Patient verbalizesunderstanding.   We will see Berenice Paulson back in: 6 months with download    Information added by my medical assistant/LPN was reviewed today       Reford Lucio ARCHIBALD, ASHLI  10/28/2020

## 2020-11-18 ENCOUNTER — HOSPITAL ENCOUNTER (OUTPATIENT)
Dept: CT IMAGING | Age: 85
Discharge: HOME | End: 2020-11-18
Payer: MEDICARE

## 2020-11-18 PROCEDURE — 70450 CT HEAD/BRAIN W/O DYE: CPT

## 2020-11-25 ENCOUNTER — HOSPITAL ENCOUNTER (OUTPATIENT)
Dept: PHYSICAL THERAPY | Age: 85
Setting detail: THERAPIES SERIES
Discharge: HOME | End: 2020-11-25
Payer: MEDICARE

## 2020-11-25 PROCEDURE — 97162 PT EVAL MOD COMPLEX 30 MIN: CPT

## 2020-11-25 PROCEDURE — 97530 THERAPEUTIC ACTIVITIES: CPT

## 2020-11-25 NOTE — FLOWSHEET NOTE
** PLEASE SIGN, DATE AND TIME CERTIFICATION BELOW AND RETURN TO [facility] (FAX #: [phone]). ATTEST/CO-SIGN IF ACCESSING VIA [employer]. THANK YOU.**    I certify that I have examined the patient below and determined that Physical Medicine and Rehabilitation service is necessary and that I approve the established plan of care for up to 90 days or as specifically noted. Attestation, signature or co-signature of physician indicates approval of certification requirements.    ________________________ ____________ __________  Physician Signature   Date   Time  [address] [facility]  PHYSICAL THERAPY  OUTPATIENT REHABILITATION - SPECIALIZED THERAPY SERVICES  [x] PELVIC HEALTH EVALUATION  [] DAILY NOTE  [] PROGRESS NOTE [] DISCHARGE NOTE    Date: 2020  Patient Name:  Don Emmanuel  : 1935  MRN: [id number]  CSN: [id number]    Referring Practitioner Awilda Cantu MD   Diagnosis Urge incontinence [N39.41]    Treatment Diagnosis ICD10: M62.89: Other specified disorders of muscle, M62.81: Muscle weakness (generalized),R39.15: Urgency of urination, N39.41: Urge incontinence, N39.3: Stress incontinence (female) (male), R35.0: Frequency of micturition   Date of Evaluation 20    Additional Pertinent History Pt has a history of bladder sling. She has a  shunt      Functional Outcome Measure Used IIQ and HAILEE   Functional Outcome Score 16 and 12 respectively 10/14/20, do not expect change by today as pt has been evaled only. Insurance: Primary: Payor: MEDICARE /  /  / ,   Secondary: [address]: Unlimited, medical necessity, no ionto   Visit # 1, 1/10 for progress note   Visits Allowed: Unlimited, medical necessity.      Recertification Date: None   Physician Follow-Up: Nothing scheduled   Physician Orders: Eval and treat   History of Present Illness: Incontinence for several years, tx once surgically 12 years ago.  Has gradually returned. SUBJECTIVE: This pt has been seen only once for evaluation at another clinic by this therapist.  That clinic has closed and pt is now being evaluated at this new location. She has had only evaluation due to c/c twice for illness. The pt reports years of stress incontinence and urge incontinence that seems to have come on gradually. She has to void every hour or she begins to leak. She is getting up once per night to void. She feels this condition is isolating and limited her social life. She is wearing pads which are an extra cost to her. 11/25/20: The pt states that she started out very well after last visit, but fell ill shortly after her appointment and her progress was all lost.  She continues to describe frequency, and what she describes as, moderate urge. States she cannot stop the flow of urine once it starts. Her greatest challenge is lifting. Social/Functional History and Current Status:  Medications and Allergies have been reviewed and are listed on Medical History Questionnaire. Tiffany Vincent lives with spouse in a multiple floor home with stairs and a handrail to enter.     Task Previous Current   ADLs  Independent Independent   IADL's Independent Independent   Ambulation Independent Independent   Transfers Independent Independent   Recreation Independent Independent   Community Integration Independent Independent   Driving Active  Active    Work Retired  Retired     PAIN    Location Pt denies pain   Description    Increased by    Decreased by    Maximum Intensity    Best Intensity    Todays Rating    Other/Function        History of Abuse:No history of physical, emotional or sexual abuse reported    SEXUAL /MENSTRUAL HISTORY [] Deferred secondary to:    Age of First Menstrual Period NA   Cycles Regular NA   Pain During Menses NA   Birth Control NA   Number of Pregnancies 4   Number of Vaginal Deliveries 2 with 1 episiotomy   Number of Caesarean Deliveries 1        BLADDER ASSESSMENT [] Deferred secondary to:   Daily Fluid Ingestion: 3 cups of decaf, water 5 glasses   Urination Frequency Times/Day: 8-10  Times/Night: 0-1   Volume Average   Urge Sensation Strong   SYMPTOM QUESTIONNAIRE   Loses Urine Upon: Sneezing/Coughing and Lifting   Incontinence Volume: Large   Frequency of Leakage: Daily, pt feels damp all the time   Wets the Bed: no   Burning/Pain with Urination: Yes, pt states she has had some irritation, 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      Frequency:    Most Common Stool Consistency:    SYMPTOM QUESTIONNAIRE   Strain to have a BM:    Include fiber in your diet:    Take laxatives/enemas regularly:    Pain with BM:    Strong urge to have BM:    Leak/Stain Feces:    Diarrhea often:          SEXUAL ASSESSMENT [x] Deferred secondary to: Pt denies issue   Sexually Active    Pain with [place] (Dyspareunia)    Painful Penetration    Lubrication Needed    Pain After [place]          VITALS [] Deferred secondary to:   Height 62\"   Weight 133#   BMI 24.32   Blood Pressure NA   Pulse 66 beats per minute   Respiration Normal   Temperature 93.3 degrees F       GENERAL ASSESSMENT   [] Deferred secondary to:   Palpation    Observation    Posture fair    Range of Motion [facility]   Strength WFL   Gait WFL for stated age   Sensation Intact B LE's   Edema None B LE's, varicose veins present, noted at last assessment   Balance/Fall History Denies balance or fall problems   Special Tests            OBSERVATION  [x] Deferred secondary to:  Completed last visit, pt able to demo good PFM contraction with no abnormalities weakness in the core, loss of hip girdle flexibility, and lack of insight into condition 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 She requires skilled PT to address these impairments in order to promote normal micturition frequency, urge deferment  with control, and continence during exertion. Without therapy this pt is at risk for UTI, isolation and subsequent mental  depression. Body Structures/Functions/Activity Limitations:   - urge incontinence  - stress incontinence  - Core weakness  - PFM weakness  - Lack of endurance  - Lack of HEP    Prognosis: Good    GOALS:  Patient Goal: To reduce leakage and abolish it if possible    Short Term Goals:  Time Frame: 6 weeks  1: (6 Weeks)  Pt to identify normal bladder fxn and voiding patterns, diet effects on bladder, and urge control strategies for  increased ease of continence and insight into her own conditions causative factors. 2: (6 Weeks)  Pt will be able to delay urge for 5' with good control to enable her time to get to the bathroom. 3: (6 Weeks)  Pt to reduce voiding frequency by 3 to increase ease of task completion. 4: (6 Weeks)  The pt will be independent with basic HEP designed to increase flexibility and core strength, and PFM strength  for improved support of internal organs during strain and improved continence. 5: (6 Weeks)  The pt will demo proper pelvic brace in order to decrease increase continence    Long Term Goals:  Time Frame: 12 weeks  1: (12 Weeks)  Pt to demonstrate independence with advanced HEP to allow the pt to continue independently to maintain gains  made in therapy. 2: (12 Weeks)  Core strength 3/5 with bottom to top contraction habitual to better support pelvic organs for ongoing continence  after therapy. 3: (12 Weeks)  Pt to reduce pad usage by one per day to indicate increased bladder control.   4: (12 Weeks)  Pt to delay void by 13' with use of urge control strategies to increase her comfort in public places by allowing  time to get to the bathroom. 5: (12 Weeks)  Pt's IIQ and HAILEE scores will each be < 6 to demo improved symptoms in functional situations. 6: (12 Weeks)  Increase PERF score to 2/10/10/10 in order to decrease leakage and to improve performance of the pelvic floor  in increasing visceral support during times of strain    Patient Education:    [x]  HEP/Education Completed: NL bladder, diet impact on bladder, and urge control along with threshold education, and bladder/PFM inverse relationship to increase insight into her own condition.  [employer] Access Code:  []  No new Education completed  []  Reviewed Prior HEP      []  Patient verbalized and/or demonstrated understanding of education provided. []  Patient unable to verbalize and/or demonstrate understanding of education provided. Will continue education. []  Barriers to learning:     PLAN:  Treatment Recommendations: Strengthening, Range of Motion, Balance Training, Functional Mobility Training, Transfer Training, Endurance Training, Gait Training, Neuromuscular Re-education, Manual Therapy - Soft Tissue Mobilization, Manual Therapy - Joint Manipulation, Home Exercise Program, Patient Education and Self-Care Education and Training    [x]  Plan of care initiated. Plan to see patient 1x every other week to address the treatment planned outlined above.   []  Continue with current plan of care  []  Modify plan of care as follows:    []  Hold pending physician visit  []  Discharge    Time In 0820   Time Out 0920   Timed Code Minutes: 45 min   Total Treatment Time: 60 min       Electronically Signed by: Chuck Polk

## 2020-12-09 ENCOUNTER — HOSPITAL ENCOUNTER (OUTPATIENT)
Dept: PHYSICAL THERAPY | Age: 85
Setting detail: THERAPIES SERIES
Discharge: HOME | End: 2020-12-09

## 2020-12-09 ENCOUNTER — HOSPITAL ENCOUNTER (OUTPATIENT)
Dept: PHYSICAL THERAPY | Age: 85
Setting detail: THERAPIES SERIES
Discharge: HOME | End: 2020-12-09
Payer: MEDICARE

## 2020-12-09 PROCEDURE — 97110 THERAPEUTIC EXERCISES: CPT

## 2020-12-09 NOTE — PROGRESS NOTES
Menses NA   Birth Control NA   Number of Pregnancies 4   Number of Vaginal Deliveries 2 with 1 episiotomy   Number of Caesarean Deliveries 1        BLADDER ASSESSMENT [] Deferred secondary to:   Daily Fluid Ingestion: 3 cups of decaf, water 5 glasses   Urination Frequency Times/Day: 8-10  Times/Night: 0-1   Volume Average   Urge Sensation Strong   SYMPTOM QUESTIONNAIRE   Loses Urine Upon: Sneezing/Coughing and Lifting   Incontinence Volume: Large   Frequency of Leakage: Daily, pt feels damp all the time   Wets the Bed: no   Burning/Pain with Urination: Yes, pt states she has had some irritation, 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 Prior results below   Exam Completed 10-14-20   Sensation Intact   Muscle Localization Good   Palpation/Tone Normal   Pelvic Floor Strength 2/4/10/10   Relax after Contraction Normal   Prolapse NA           TREATMENT   Precautions: Cardiac history Mitral valve prolapse, intraventricular septal defect.  shunt. X in shaded column indicates activity completed today   Modalities Parameters/  Location  Notes                     Manual Therapy Time/Technique  Notes                     Exercise/Intervention    Notes   Normal bladder, Diet impact on bladder, Urge control   x    Basic Kegel program review, pt can hold 6 count   x    Ball and Band   x    Tummy tuck quick only    x Will progress to holds next visit.      Tummy tuck hold   x    Pelvic Brace Quick and Hold   x    Pelvic Brace with lift, squat       Pelvic tilt 10  x Extra time to learn   With march 10  x    With combo 10  x    With heel walk       SLR       Bridge with ball 10  x  No ball yet   Adductor stretch       Piriformis stretch                                            Specific Interventions Next Treatment: Educate in function with pelvic brace as pt is very active. Stretching as needed    Activity/Treatment Tolerance:  [x]  Patient tolerated treatment well  []  Patient limited by fatigue  []  Patient limited by pain   []  Patient limited by medical complications  []  Other:     Assessment: This pt is progressing as expected. She is seeing a decrease in her urgency and can delay 10'. She continues to leak with cough and sneeze and was educated that remediation of this is very slow and requires continued compliance. She verbalized understanding. Body Structures/Functions/Activity Limitations:   - urge incontinence  - stress incontinence  - Core weakness  - PFM weakness  - Lack of endurance  - Lack of HEP    Prognosis: Good    GOALS:  Patient Goal: To reduce leakage and abolish it if possible    Short Term Goals:  Time Frame: 6 weeks  1: (6 Weeks)  Pt to identify normal bladder fxn and voiding patterns, diet effects on bladder, and urge control strategies for  increased ease of continence and insight into her own conditions causative factors. 2: (6 Weeks)  Pt will be able to delay urge for 5' with good control to enable her time to get to the bathroom. 3: (6 Weeks)  Pt to reduce voiding frequency by 3 to increase ease of task completion. 4: (6 Weeks)  The pt will be independent with basic HEP designed to increase flexibility and core strength, and PFM strength  for improved support of internal organs during strain and improved continence. 5: (6 Weeks)  The pt will demo proper pelvic brace in order to decrease increase continence    Long Term Goals:  Time Frame: 12 weeks  1: (12 Weeks)  Pt to demonstrate independence with advanced HEP to allow the pt to continue independently to maintain gains  made in therapy.   2: (12 Weeks)  Core strength 3/5 with bottom to top contraction habitual to better support pelvic organs for ongoing continence  after therapy. 3: (12 Weeks)  Pt to reduce pad usage by one per day to indicate increased bladder control. 4: (12 Weeks)  Pt to delay void by 13' with use of urge control strategies to increase her comfort in public places by allowing  time to get to the bathroom. 5: (12 Weeks)  Pt's IIQ and HAILEE scores will each be < 6 to demo improved symptoms in functional situations. 6: (12 Weeks)  Increase PERF score to 2/10/10/10 in order to decrease leakage and to improve performance of the pelvic floor  in increasing visceral support during times of strain    Patient Education:    [x]  HEP/Education Completed: The pt was given handouts of all exercises and these were reviewed with the pt with notations added by PT to enhance pt understanding and ease of follow through at home.  [employer] Access Code:  []  No new Education completed  []  Reviewed Prior HEP      []  Patient verbalized and/or demonstrated understanding of education provided. []  Patient unable to verbalize and/or demonstrate understanding of education provided. Will continue education. []  Barriers to learning:     PLAN:  Treatment Recommendations: Strengthening, Range of Motion, Balance Training, Functional Mobility Training, Transfer Training, Endurance Training, Gait Training, Neuromuscular Re-education, Manual Therapy - Soft Tissue Mobilization, Manual Therapy - Joint Manipulation, Home Exercise Program, Patient Education and Self-Care Education and Training    [x]  Plan of care initiated. Plan to see patient 1x every other week to address the treatment planned outlined above.   []  Continue with current plan of care  []  Modify plan of care as follows:    []  Hold pending physician visit  []  Discharge    Time In 815   Time Out 900   Timed Code Minutes: 45   Total Treatment Time: 45       Electronically Signed by: Desiree Rossi Migdalia Dotson

## 2020-12-22 ENCOUNTER — HOSPITAL ENCOUNTER (OUTPATIENT)
Dept: PHYSICAL THERAPY | Age: 85
Setting detail: THERAPIES SERIES
Discharge: HOME | End: 2020-12-22
Payer: MEDICARE

## 2020-12-22 PROCEDURE — 97110 THERAPEUTIC EXERCISES: CPT

## 2020-12-22 NOTE — PROGRESS NOTES
on Medical History Questionnaire. Jomar Sauer lives with spouse in a multiple floor home with stairs and a handrail to enter. 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 Prior results below   Exam Completed 10-14-20   Sensation Intact   Muscle Localization Good   Palpation/Tone Normal   Pelvic Floor Strength 2/4/10/10   Relax after Contraction Normal   Prolapse NA           TREATMENT   Precautions: Cardiac history Mitral valve prolapse, intraventricular septal defect.  shunt.      X in shaded column indicates activity completed today   Modalities Parameters/  Location  Notes                     Manual Therapy Time/Technique  Notes                     Exercise/Intervention    Notes   Normal bladder, Diet impact on bladder, Urge control       Basic Kegel program review, pt can hold 6 count       Ball and Band       Tummy tuck quick only    x Progressed to hold    Tummy tuck hold   x    Pelvic Brace Quick and Hold   x    Pelvic Brace with lift, squat   x    Pelvic tilt   x    With march  10 x    With combo  10 x    With heel walk  5 x    SLR       Bridge with ball  10 x    Adductor stretch  Attempted  Pt was unable to perceive stretch in proper location   Piriformis stretch  3 x 20\" x Sitting, leg crossed verse figure four          Standing progressions                              Specific Interventions Next Treatment: Educate in pelvic brace and include function as pt is very active. Stretching as needed    Activity/Treatment Tolerance:  [x]  Patient tolerated treatment well  []  Patient limited by fatigue  []  Patient limited by pain   []  Patient limited by medical complications  []  Other:     Assessment: This pt is noticing some improvement in urgency and in leakage, though she does continue to have days where her leakage is worse. She advanced in her pelvic brace today with addition of sit to stand with breathing and progressed DLSP. Begin transition to stand to increase functional continence next visit. Body Structures/Functions/Activity Limitations:   - urge incontinence  - stress incontinence  - Core weakness  - PFM weakness  - Lack of endurance  - Lack of HEP    Prognosis: Good    GOALS:  Patient Goal: To reduce leakage and abolish it if possible    Short Term Goals:  Time Frame: 6 weeks  1: (6 Weeks)  Pt to identify normal bladder fxn and voiding patterns, diet effects on bladder, and urge control strategies for  increased ease of continence and insight into her own conditions causative factors. 2: (6 Weeks)  Pt will be able to delay urge for 5' with good control to enable her time to get to the bathroom. 3: (6 Weeks)  Pt to reduce voiding frequency by 3 to increase ease of task completion. 4: (6 Weeks)  The pt will be independent with basic HEP designed to increase flexibility and core strength, and PFM strength  for improved support of internal organs during strain and improved continence.   5: (6 Weeks)  The pt will demo proper pelvic brace in order to decrease increase continence    Long Term Goals:  Time Frame: 12 weeks  1: (12 Weeks)  Pt to demonstrate independence with advanced HEP to allow the pt to continue independently to maintain gains  made in therapy. 2: (12 Weeks)  Core strength 3/5 with bottom to top contraction habitual to better support pelvic organs for ongoing continence  after therapy. 3: (12 Weeks)  Pt to reduce pad usage by one per day to indicate increased bladder control. 4: (12 Weeks)  Pt to delay void by 13' with use of urge control strategies to increase her comfort in public places by allowing  time to get to the bathroom. 5: (12 Weeks)  Pt's IIQ and HAILEE scores will each be < 6 to demo improved symptoms in functional situations. 6: (12 Weeks)  Increase PERF score to 2/10/10/10 in order to decrease leakage and to improve performance of the pelvic floor  in increasing visceral support during times of strain    Patient Education:    [x]  HEP/Education Completed: The pt was given handouts of all exercises and these were reviewed with the pt with notations added by PT to enhance pt understanding and ease of follow through at home.     [employer] Access Code:  []  No new Education completed  []  Reviewed Prior HEP      []  Patient verbalized and/or demonstrated understanding of education provided. []  Patient unable to verbalize and/or demonstrate understanding of education provided. Will continue education. []  Barriers to learning:     PLAN:  Treatment Recommendations: Strengthening, Range of Motion, Balance Training, Functional Mobility Training, Transfer Training, Endurance Training, Gait Training, Neuromuscular Re-education, Manual Therapy - Soft Tissue Mobilization, Manual Therapy - Joint Manipulation, Home Exercise Program, Patient Education and Self-Care Education and Training    [x]  Plan of care initiated. Plan to see patient 1x every other week to address the treatment planned outlined above.   []  Continue with current plan of care  []  Modify plan of care as follows:    []  Hold pending physician visit  [] Discharge    Time In 750   Time Out 850   Timed Code Minutes: 60   Total Treatment Time: 60       Electronically Signed by: Shadia Garcia

## 2021-01-06 ENCOUNTER — HOSPITAL ENCOUNTER (OUTPATIENT)
Dept: PHYSICAL THERAPY | Age: 86
Setting detail: THERAPIES SERIES
Discharge: HOME | End: 2021-01-06
Payer: MEDICARE

## 2021-01-06 PROCEDURE — 97110 THERAPEUTIC EXERCISES: CPT

## 2021-01-06 NOTE — PROGRESS NOTES
[facility]  PHYSICAL THERAPY  OUTPATIENT REHABILITATION - SPECIALIZED THERAPY SERVICES  [] PELVIC HEALTH EVALUATION  [x] DAILY NOTE  [] PROGRESS NOTE [] DISCHARGE NOTE    Date: 2021  Patient Name:  Tabatha Luceor  : 1935  MRN: [id number]  CSN: [id number]    Referring Practitioner Migue Hucthins MD   Diagnosis Urge incontinence [N39.41]    Treatment Diagnosis ICD10: M62.89: Other specified disorders of muscle, M62.81: Muscle weakness (generalized),R39.15: Urgency of urination, N39.41: Urge incontinence, N39.3: Stress incontinence (female) (male), R35.0: Frequency of micturition   Date of Evaluation 20    Additional Pertinent History Pt has a history of bladder sling. She has a  shunt      Functional Outcome Measure Used IIQ and HAILEE   Functional Outcome Score 16 and 12 respectively 10/14/20, do not expect change by today as pt has been evaled only. Insurance: Primary: Payor: MEDICARE /  /  / ,   Secondary: [address] [place] [address] Road: Unlimited, medical necessity, no ionto   Visit # 3, 3/10 for progress note   Visits Allowed: Unlimited, medical necessity. Recertification Date: None   Physician Follow-Up: Nothing scheduled   Physician Orders: Eval and treat   History of Present Illness: Incontinence for several years, tx once surgically 16 years ago. Has gradually returned. SUBJECTIVE: The pt states that some days she is much better than others but overall she has made progress since starting therapy. She states she was out and about yesterday and had leakage that she could not stop. She states she cannot figure out a pattern as to why she is better some days. She works very hard to assure that her beverages are the same every day. She is using Estrace and she does feel that this is helping. Pt states that she no longer feels damp all of the time.       Social/Functional History and Current Status:  Medications and Allergies have been reviewed and are listed on Medical History Questionnaire. Luca Palacios lives with spouse in a multiple floor home with stairs and a handrail to enter. 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 Prior results below   Exam Completed 10-14-20   Sensation Intact   Muscle Localization Good   Palpation/Tone Normal   Pelvic Floor Strength 2/4/10/10   Relax after Contraction Normal   Prolapse NA           TREATMENT   Precautions: Cardiac history Mitral valve prolapse, intraventricular septal defect.  shunt.      X in shaded column indicates activity completed today   Modalities Parameters/  Location  Notes                     Manual Therapy Time/Technique  Notes                     Exercise/Intervention    Notes   Normal bladder, Diet impact on bladder, Urge control       Basic Kegel program review, pt can hold 6 count       Ball and Band       Tummy tuck quick only     Progressed to hold    Tummy tuck hold       Pelvic Brace Quick and Hold       Pelvic Brace with lift, squat       Pelvic tilt       With march  10 x    With combo  10 x    With heel walk  10 x    SLR 5 x    Bridge with ball  10 x    Adductor stretch  Attempted  Pt was unable to perceive stretch in proper location   Piriformis stretch  3 x 20\" x Sitting, leg crossed verse figure four          Standing progressions squat  10 x    Lunge  10 x                    Specific Interventions Next Treatment: Advance standing exercises as pt is very active. Activity/Treatment Tolerance:  [x]  Patient tolerated treatment well  []  Patient limited by fatigue  []  Patient limited by pain   []  Patient limited by medical complications  []  Other:     Assessment: This pt is noticing some improvement in urgency and in leakage, though she does continue to have days where her leakage is worse. Upon further questioning of the pt we decided that she may be overdoing things some days more than others. The pt agreed to watch for a pattern more closely along this line of thought. The pt progressed to standing today and did very well with ability to complete 10 reps. Will advance program next visit and plan on discharge to HEP pending pt is I.       Body Structures/Functions/Activity Limitations:   - urge incontinence  - stress incontinence  - Core weakness  - PFM weakness  - Lack of endurance  - Lack of HEP    Prognosis: Good    GOALS:  Patient Goal: To reduce leakage and abolish it if possible    Short Term Goals:  Time Frame: 6 weeks  1: (6 Weeks)  Pt to identify normal bladder fxn and voiding patterns, diet effects on bladder, and urge control strategies for  increased ease of continence and insight into her own conditions causative factors. 2: (6 Weeks)  Pt will be able to delay urge for 5' with good control to enable her time to get to the bathroom. 3: (6 Weeks)  Pt to reduce voiding frequency by 3 to increase ease of task completion.   4: (6 Weeks)  The pt will be independent with basic HEP designed to increase flexibility and core strength, and PFM strength  for improved support of internal organs during strain and improved continence. 5: (6 Weeks)  The pt will demo proper pelvic brace in order to decrease increase continence    Long Term Goals:  Time Frame: 12 weeks  1: (12 Weeks)  Pt to demonstrate independence with advanced HEP to allow the pt to continue independently to maintain gains  made in therapy. 2: (12 Weeks)  Core strength 3/5 with bottom to top contraction habitual to better support pelvic organs for ongoing continence  after therapy. 3: (12 Weeks)  Pt to reduce pad usage by one per day to indicate increased bladder control. 4: (12 Weeks)  Pt to delay void by 13' with use of urge control strategies to increase her comfort in public places by allowing  time to get to the bathroom. 5: (12 Weeks)  Pt's IIQ and HAILEE scores will each be < 6 to demo improved symptoms in functional situations. 6: (12 Weeks)  Increase PERF score to 2/10/10/10 in order to decrease leakage and to improve performance of the pelvic floor  in increasing visceral support during times of strain    Patient Education:    [x]  HEP/Education Completed: The pt was given handouts of all exercises and these were reviewed with the pt with notations added by PT to enhance pt understanding and ease of follow through at home.     [employer] Access Code:  []  No new Education completed  []  Reviewed Prior HEP      []  Patient verbalized and/or demonstrated understanding of education provided. []  Patient unable to verbalize and/or demonstrate understanding of education provided. Will continue education. []  Barriers to learning:     PLAN:  Treatment Recommendations: Strengthening, Range of Motion, Balance Training, Functional Mobility Training, Transfer Training, Endurance Training, Gait Training, Neuromuscular Re-education, Manual Therapy - Soft Tissue Mobilization, Manual Therapy - Joint Manipulation, Home Exercise Program, Patient Education and Self-Care Education and Training    []  Plan of care initiated.   Plan to see patient 1x every other week to address the treatment planned outlined above.   [x]  Continue with current plan of care  []  Modify plan of care as follows:    []  Hold pending physician visit  []  Discharge    Time In 1000   Time Out 1100   Timed Code Minutes: 60   Total Treatment Time: 60       Electronically Signed by: Maria M Weston

## 2021-01-19 ENCOUNTER — HOSPITAL ENCOUNTER (OUTPATIENT)
Dept: PHYSICAL THERAPY | Age: 86
Setting detail: THERAPIES SERIES
Discharge: HOME | End: 2021-01-19
Payer: MEDICARE

## 2021-01-19 PROCEDURE — 97110 THERAPEUTIC EXERCISES: CPT

## 2021-01-19 PROCEDURE — 97530 THERAPEUTIC ACTIVITIES: CPT

## 2021-01-19 NOTE — DISCHARGE SUMMARY
[address] [facility]  PHYSICAL THERAPY  OUTPATIENT REHABILITATION - SPECIALIZED THERAPY SERVICES  [] PELVIC HEALTH EVALUATION  [x] DAILY NOTE  [] PROGRESS NOTE [] DISCHARGE NOTE    Date: 2021  Patient Name:  Burtis Sicard  : 1935  MRN: [id number]  [facility]: [id number]    Referring Practitioner Marlena Jurado MD   Diagnosis Urge incontinence [N39.41]    Treatment Diagnosis ICD10: M62.89: Other specified disorders of muscle, M62.81: Muscle weakness (generalized),R39.15: Urgency of urination, N39.41: Urge incontinence, N39.3: Stress incontinence (female) (male), R35.0: Frequency of micturition   Date of Evaluation 20    Additional Pertinent History Pt has a history of bladder sling. She has a  shunt      Functional Outcome Measure Used IIQ and HAILEE   Functional Outcome Score 16 and 12 respectively 10/14/20, do not expect change by today as pt has been evaled only. Insurance: Primary: Payor: MEDICARE /  /  / ,   Secondary: [address] [place] [address] Road: Unlimited, medical necessity, no ionto   Visit # 4, 4/10 for progress note   Visits Allowed: Unlimited, medical necessity. Recertification Date: None   Physician Follow-Up: Nothing scheduled   Physician Orders: Eval and treat   History of Present Illness: Incontinence for several years, tx once surgically 16 years ago. Has gradually returned. SUBJECTIVE: The pt states that some days she is much better than others but overall she has made progress since starting therapy. She has been unable to establish any pattern with why her leakage is worse some days verses others. She denies constipation. She reports she senses a bubble sensation typically in standing, in the urethra, and she loses the whole bladder. She is unable to stop it once its starts. She states that everything is better since she started her therapy, she states, \"I was wet lots more often before I started therapy\".   She Basic Kegel program review, pt can hold 6 count       Ball and Band       Tummy tuck quick only     Progressed to hold    Tummy tuck hold       Pelvic Brace Quick and Hold       Pelvic Brace with lift, squat       Pelvic tilt       With march  10     With combo  10     With heel walk  10     SLR  5     Bridge with ball  10     Adductor stretch  Attempted  Pt was unable to perceive stretch in proper location   Piriformis stretch  3 x 20\"  Sitting, leg crossed verse figure four          Standing progressions squat  10 x    Lunge  10 x    Heel raise Kegel  10 x    Side step kegel  10       Specific Interventions Next Treatment: Advance standing exercises as pt is very active. Activity/Treatment Tolerance:  [x]  Patient tolerated treatment well  []  Patient limited by fatigue  []  Patient limited by pain   []  Patient limited by medical complications  []  Other:     Assessment: This pt is noticing some improvement in urgency and in leakage, though she does continue to have days where her leakage is worse. Upon further questioning of the pt we decided that she may be overdoing things some days more than others. She has also had more stress lately due to having a sick dog. The pt was educated in three factors that can be the cause of bad days: bladder irritant consumed (pt vigilantly avoids this), stress, and higher degree of physical activity than is typical.  She verbalized understanding. PT worked with pt to advance her standing program today and then educated pt in the manner in which to reduce her HEP once her leakage was stable or had stopped. She verbalized understanding. At this point she is competent in her HEP and her leakage has decreased since starting therapy. She admits to being very impatient for complete recovery and feels that this may cause her to feel frustrated in spite of results gained thus far.   At present her greatest leaks occur in standing with urge, she does feel this is better than when she started. Body Structures/Functions/Activity Limitations:   - urge incontinence  - stress incontinence  - Core weakness  - PFM weakness  - Lack of endurance  - Lack of HEP    Prognosis: Good    GOALS:  Patient Goal: To reduce leakage and abolish it if possible    Short Term Goals:  Time Frame: 6 weeks  1: (6 Weeks)  Pt to identify normal bladder fxn and voiding patterns, diet effects on bladder, and urge control strategies for  increased ease of continence and insight into her own conditions causative factors. -MET  2: (6 Weeks)  Pt will be able to delay urge for 5' with good control to enable her time to get to the bathroom. -Not met, pt continues to leak with urge. 3: (6 Weeks)  Pt to reduce voiding frequency by 3 to increase ease of task completion. -MET  4: (6 Weeks)  The pt will be independent with basic HEP designed to increase flexibility and core strength, and PFM strength  for improved support of internal organs during strain and improved continence. - MET  5: (6 Weeks)  The pt will demo proper pelvic brace in order to decrease increase continence- MET    Long Term Goals:  Time Frame: 12 weeks  1: (12 Weeks)  Pt to demonstrate independence with advanced HEP to allow the pt to continue independently to maintain gains  made in therapy. -MET  2: (12 Weeks)  Core strength 3/5 with bottom to top contraction habitual to better support pelvic organs for ongoing continence  after therapy. -MET  3: (12 Weeks)  Pt to reduce pad usage by one per day to indicate increased bladder control. -MET  4: (12 Weeks)  Pt to delay void by 13' with use of urge control strategies to increase her comfort in public places by allowing  time to get to the bathroom.-Not met, marginal progress. 5: (12 Weeks)  Pt's IIQ and HAILEE scores will each be < 6 to demo improved symptoms in functional situations. -MET  6: (12 Weeks)  Increase PERF score to 2/10/10/10 in order to decrease leakage and to improve performance of the pelvic floor  in increasing visceral support during times of strain-MET    Patient Education:    [x]  HEP/Education Completed: The pt was given handouts of all exercises and these were reviewed with the pt with notations added by PT to enhance pt understanding and ease of follow through at home.     [employer] Access Code:  []  No new Education completed  []  Reviewed Prior HEP      []  Patient verbalized and/or demonstrated understanding of education provided. []  Patient unable to verbalize and/or demonstrate understanding of education provided. Will continue education. []  Barriers to learning:     PLAN:  Treatment Recommendations: Strengthening, Range of Motion, Balance Training, Functional Mobility Training, Transfer Training, Endurance Training, Gait Training, Neuromuscular Re-education, Manual Therapy - Soft Tissue Mobilization, Manual Therapy - Joint Manipulation, Home Exercise Program, Patient Education and Self-Care Education and Training    []  Plan of care initiated. Plan to see patient 1x every other week to address the treatment planned outlined above.   []  Continue with current plan of care  []  Modify plan of care as follows:    []  Hold pending physician visit  [x]  Discharge    Time In 1100   Time Out 1200   Timed Code Minutes: 60   Total Treatment Time: 60       Electronically Signed by: Priscilla Fields

## 2021-04-28 ENCOUNTER — OFFICE VISIT (OUTPATIENT)
Dept: PULMONOLOGY | Age: 86
End: 2021-04-28
Payer: MEDICARE

## 2021-04-28 VITALS
DIASTOLIC BLOOD PRESSURE: 72 MMHG | OXYGEN SATURATION: 98 % | BODY MASS INDEX: 24.44 KG/M2 | HEART RATE: 59 BPM | WEIGHT: 132.8 LBS | SYSTOLIC BLOOD PRESSURE: 118 MMHG | HEIGHT: 62 IN

## 2021-04-28 DIAGNOSIS — Q21.0 VSD (VENTRICULAR SEPTAL DEFECT): ICD-10-CM

## 2021-04-28 DIAGNOSIS — G47.10 HYPERSOMNIA: ICD-10-CM

## 2021-04-28 DIAGNOSIS — G47.31 COMPLEX SLEEP APNEA SYNDROME: Primary | ICD-10-CM

## 2021-04-28 PROCEDURE — G8420 CALC BMI NORM PARAMETERS: HCPCS | Performed by: PHYSICIAN ASSISTANT

## 2021-04-28 PROCEDURE — 1036F TOBACCO NON-USER: CPT | Performed by: PHYSICIAN ASSISTANT

## 2021-04-28 PROCEDURE — G8400 PT W/DXA NO RESULTS DOC: HCPCS | Performed by: PHYSICIAN ASSISTANT

## 2021-04-28 PROCEDURE — 4040F PNEUMOC VAC/ADMIN/RCVD: CPT | Performed by: PHYSICIAN ASSISTANT

## 2021-04-28 PROCEDURE — 99213 OFFICE O/P EST LOW 20 MIN: CPT | Performed by: PHYSICIAN ASSISTANT

## 2021-04-28 PROCEDURE — 1090F PRES/ABSN URINE INCON ASSESS: CPT | Performed by: PHYSICIAN ASSISTANT

## 2021-04-28 PROCEDURE — 1123F ACP DISCUSS/DSCN MKR DOCD: CPT | Performed by: PHYSICIAN ASSISTANT

## 2021-04-28 PROCEDURE — G8427 DOCREV CUR MEDS BY ELIG CLIN: HCPCS | Performed by: PHYSICIAN ASSISTANT

## 2021-04-28 ASSESSMENT — ENCOUNTER SYMPTOMS
SHORTNESS OF BREATH: 0
CHEST TIGHTNESS: 0
STRIDOR: 0
COUGH: 0
WHEEZING: 0
EYES NEGATIVE: 1
NAUSEA: 0
BACK PAIN: 0
ALLERGIC/IMMUNOLOGIC NEGATIVE: 1
DIARRHEA: 0

## 2021-04-28 NOTE — PROGRESS NOTES
Allergic/Immunologic: Negative. Neurological: Negative. Negative for dizziness and light-headedness. Psychiatric/Behavioral: Negative. All other systems reviewed and are negative. Physical Exam:    BMI:  Body mass index is 24.29 kg/m². Wt Readings from Last 3 Encounters:   04/28/21 132 lb 12.8 oz (60.2 kg)   10/28/20 135 lb (61.2 kg)   06/24/20 133 lb (60.3 kg)     Weight stable / unchanged  Vitals: /72 (Site: Right Upper Arm, Position: Sitting, Cuff Size: Medium Adult)   Pulse 59   Ht 5' 2\" (1.575 m)   Wt 132 lb 12.8 oz (60.2 kg)   SpO2 98% Comment: on room air at rest  BMI 24.29 kg/m²       Physical Exam  Constitutional:       Appearance: She is well-developed. HENT:      Head: Normocephalic and atraumatic. Right Ear: External ear normal.      Left Ear: External ear normal.   Eyes:      Conjunctiva/sclera: Conjunctivae normal.      Pupils: Pupils are equal, round, and reactive to light. Neck:      Musculoskeletal: Normal range of motion and neck supple. Cardiovascular:      Rate and Rhythm: Normal rate and regular rhythm. Heart sounds: Normal heart sounds. Pulmonary:      Effort: Pulmonary effort is normal.      Breath sounds: Normal breath sounds. Musculoskeletal: Normal range of motion. Skin:     General: Skin is warm and dry. Neurological:      Mental Status: She is alert and oriented to person, place, and time. Psychiatric:         Behavior: Behavior normal.         Thought Content: Thought content normal.         Judgment: Judgment normal.         ASSESSMENT/DIAGNOSIS     Diagnosis Orders   1. Complex sleep apnea syndrome     2. VSD (ventricular septal defect)     3. Hypersomnia              Plan   Do you need any equipment today? No  - Download reviewed and discussed with patient  - She  was advised to continue current positive airway pressure therapy with above described pressure.    - She  advised to keep good compliance with current recommended pressure to get optimal results and clinical improvement  - Recommend 7-9 hours of sleep with PAP  - She was advised to call [employer] company regarding supplies if needed.   -She call my office for earlier appointment if needed for worsening of sleep symptoms.   - She was instructed on weight loss  - Jose Bella was educated about my impression and plan. Patient verbalizesunderstanding.   We will see Benoit Mcdonaldchrist back in: 1 year with download    Information added by my medical assistant/LPN was reviewed today       Ten Lemus PA-C, MPAS  4/28/2021

## 2021-05-20 ENCOUNTER — OFFICE VISIT (OUTPATIENT)
Dept: CARDIOLOGY CLINIC | Age: 86
End: 2021-05-20
Payer: MEDICARE

## 2021-05-20 VITALS
HEART RATE: 63 BPM | HEIGHT: 62 IN | SYSTOLIC BLOOD PRESSURE: 132 MMHG | DIASTOLIC BLOOD PRESSURE: 68 MMHG | BODY MASS INDEX: 24.29 KG/M2 | WEIGHT: 132 LBS

## 2021-05-20 DIAGNOSIS — I10 ESSENTIAL HYPERTENSION: ICD-10-CM

## 2021-05-20 DIAGNOSIS — I25.10 CORONARY ARTERY DISEASE INVOLVING NATIVE CORONARY ARTERY OF NATIVE HEART WITHOUT ANGINA PECTORIS: Primary | ICD-10-CM

## 2021-05-20 DIAGNOSIS — R06.02 SHORTNESS OF BREATH: ICD-10-CM

## 2021-05-20 PROCEDURE — 1036F TOBACCO NON-USER: CPT | Performed by: NUCLEAR MEDICINE

## 2021-05-20 PROCEDURE — G8400 PT W/DXA NO RESULTS DOC: HCPCS | Performed by: NUCLEAR MEDICINE

## 2021-05-20 PROCEDURE — G8420 CALC BMI NORM PARAMETERS: HCPCS | Performed by: NUCLEAR MEDICINE

## 2021-05-20 PROCEDURE — G8427 DOCREV CUR MEDS BY ELIG CLIN: HCPCS | Performed by: NUCLEAR MEDICINE

## 2021-05-20 PROCEDURE — 1123F ACP DISCUSS/DSCN MKR DOCD: CPT | Performed by: NUCLEAR MEDICINE

## 2021-05-20 PROCEDURE — 4040F PNEUMOC VAC/ADMIN/RCVD: CPT | Performed by: NUCLEAR MEDICINE

## 2021-05-20 PROCEDURE — 99214 OFFICE O/P EST MOD 30 MIN: CPT | Performed by: NUCLEAR MEDICINE

## 2021-05-20 PROCEDURE — 1090F PRES/ABSN URINE INCON ASSESS: CPT | Performed by: NUCLEAR MEDICINE

## 2021-05-20 PROCEDURE — 93000 ELECTROCARDIOGRAM COMPLETE: CPT | Performed by: NUCLEAR MEDICINE

## 2021-05-20 RX ORDER — DIAPER,BRIEF,INFANT-TODD,DISP
EACH MISCELLANEOUS 2 TIMES DAILY
COMMUNITY

## 2021-05-20 RX ORDER — KETOCONAZOLE 20 MG/G
CREAM TOPICAL DAILY
COMMUNITY

## 2021-05-20 RX ORDER — HYDROCHLOROTHIAZIDE 12.5 MG/1
12.5 TABLET ORAL DAILY
COMMUNITY
End: 2021-05-20 | Stop reason: SDUPTHER

## 2021-05-20 RX ORDER — HYDROCHLOROTHIAZIDE 12.5 MG/1
12.5 TABLET ORAL DAILY
Qty: 30 TABLET | Refills: 11 | Status: SHIPPED | OUTPATIENT
Start: 2021-05-20 | End: 2021-08-09 | Stop reason: SDUPTHER

## 2021-05-20 NOTE — PROGRESS NOTES
[facility] [facility] .  [address] [address]  Dept: [phone]  Dept Fax: [phone]  Loc: [phone]    Visit Date: 2021    Dann Nava is a 81 y.o. female who presents todayfor:  Chief Complaint   Patient presents with    Check-Up     EKG done today    Coronary Artery Disease    Ventricular Septal Defect    Hypertension   known VSD  Having more dyspnea  More than usual   No chest pain  Seems to have more dyspnea than usual   Some leg edema   BP is stable  No dizziness  No syncope        HPI:  HPI  Past Medical History:   Diagnosis Date    Arthritis     CAD (coronary artery disease)     Heart murmur     Hyperlipidemia     Sleep apnea     Thyroid disease     Hypothyroid    VSD (ventricular septal defect)       Past Surgical History:   Procedure Laterality Date    BLADDER SUSPENSION       SECTION      x2    COLONOSCOPY      DILATATION, ESOPHAGUS      DOPPLER ECHOCARDIOGRAPHY  10-01-10    EF 45-50%    EYE SURGERY Bilateral     cataracts    HYSTERECTOMY      KNEE CARTILAGE SURGERY Right     OTHER SURGICAL HISTORY  2015    VAGINAL ANTERIOR AND POSTERIOR REPAIR , SOLYX BLADDER SLING, CYSTOSCOPY    SHOULDER SURGERY Right     SINUS SURGERY      TUBAL LIGATION      VENTRICULOPERITONEAL SHUNT Right 10/11/2016    Dr Evelyn Horner     Family History   Problem Relation Age of Onset    Cancer Sister     Heart Disease Sister      Social History     Tobacco Use    Smoking status: Never Smoker    Smokeless tobacco: Never Used   Substance Use Topics    Alcohol use: No     Alcohol/week: 0.0 standard drinks      Current Outpatient Medications   Medication Sig Dispense Refill    ketoconazole (NIZORAL) 2 % cream Apply topically daily Apply topically daily.  hydrocortisone 0.5 % cream Apply topically 2 times daily Apply topically 2 times daily.       Estradiol (ESTRACE PO) Take by mouth      Methylcellulose, Laxative, (CITRUCEL PO) Take by mouth      CPAP Machine MISC by Does not apply route Please change BIPAP pressure to 18/15 cm H20. 1 each 0    Probiotic Product (PROBIOTIC DAILY PO) Take by mouth daily      Multiple Vitamin (MULTI VITAMIN PO) Take by mouth daily      CALCIUM PO Take by mouth daily      metroNIDAZOLE (METROCREAM) 0.75 % cream daily  0    levothyroxine (SYNTHROID) 112 MCG tablet Take 112 mcg by mouth Daily.  simvastatin (ZOCOR) 10 MG tablet Take 10 mg by mouth nightly. No current facility-administered medications for this visit. Allergies   Allergen Reactions    Iv Dye [Iodides]     Sulfa Antibiotics Hives    Iodine Rash     blisters     Health Maintenance   Topic Date Due    Shingles Vaccine (1 of 2) Never done    DEXA (modify frequency per FRAX score)  Never done    Pneumococcal 65+ years Vaccine (1 of 1 - PPSV23) Never done   [employer] Visit (AWV)  Never done    TSH testing  05/05/2021    Potassium monitoring  05/05/2021    Creatinine monitoring  05/05/2021    Lipid screen  05/05/2021    Flu vaccine (Season Ended) 09/01/2021    DTaP/Tdap/Td vaccine (2 - Td) 09/24/2029    COVID-19 Vaccine  Completed    Hepatitis A vaccine  Aged Out    Hepatitis B vaccine  Aged Out    Hib vaccine  Aged Out    Meningococcal (ACWY) vaccine  Aged Out       Subjective:  Review of Systems  General:   No fever, no chills, No fatigue or weight loss  Pulmonary:    No dyspnea, no wheezing  Cardiac:    Denies recent chest pain,   GI:     No nausea or vomiting, no abdominal pain  Neuro:    No dizziness or light headedness,   Musculoskeletal:  No recent active issues  Extremities:   No edema, no obvious claudication       Objective:  Physical Exam  /68   Pulse 63   Ht 5' 2\" (1.575 m)   Wt 132 lb (59.9 kg)   BMI 24.14 kg/m²   General:   Well developed, well nourished  Lungs:   Clear to auscultation  Heart:    Normal S1 S2, Slight murmur.  no rubs, no gallops  Abdomen:   Soft, non tender, no organomegalies, positive bowel sounds  Extremities:   No edema, no cyanosis, good peripheral pulses  Neurological:   Awake, alert, oriented. No obvious focal deficits  Musculoskelatal:  No obvious deformities      Assessment:      Diagnosis Orders   1. Coronary artery disease involving native coronary artery of native heart without angina pectoris  EKG 12 Lead   2. Shortness of breath     3. Essential hypertension     concerning dyspnea  Possible some fluid overload  ECG in office was done today. I reviewed the ECG. No acute findings      Plan:  No follow-ups on file. Echo   Consider a low dose diuretics  Decide after that   Continue risk factor modification and medical management  Thank you for allowing me to participate in the care of your patient. Please don't hesitate to contact me regarding any further issues related to the patient care    Orders Placed:  Orders Placed This Encounter   Procedures    EKG 12 Lead     Order Specific Question:   Reason for Exam?     Answer: Other       Medications Prescribed:  No orders of the defined types were placed in this encounter. Discussed use, benefit, and side effects of prescribed medications. All patient questions answered. Pt voicedunderstanding. Instructed to continue current medications, diet and exercise. Continue risk factor modification and medical management. Patient agreed with treatment plan. Follow up as directed.     Electronically signedby Jennifer Doshi MD on 5/20/2021 at 9:50 AM

## 2021-05-21 ENCOUNTER — HOSPITAL ENCOUNTER (OUTPATIENT)
Dept: NON INVASIVE DIAGNOSTICS | Age: 86
Discharge: HOME | End: 2021-05-21
Payer: MEDICARE

## 2021-05-21 DIAGNOSIS — I10 ESSENTIAL HYPERTENSION: ICD-10-CM

## 2021-05-21 DIAGNOSIS — R06.02 SHORTNESS OF BREATH: ICD-10-CM

## 2021-05-21 DIAGNOSIS — I25.10 CORONARY ARTERY DISEASE INVOLVING NATIVE CORONARY ARTERY OF NATIVE HEART WITHOUT ANGINA PECTORIS: ICD-10-CM

## 2021-05-21 LAB
LV EF: 60 %
LVEF MODALITY: NORMAL

## 2021-05-21 PROCEDURE — 93306 TTE W/DOPPLER COMPLETE: CPT

## 2021-08-09 RX ORDER — HYDROCHLOROTHIAZIDE 12.5 MG/1
12.5 TABLET ORAL DAILY
Qty: 30 TABLET | Refills: 11 | Status: SHIPPED | OUTPATIENT
Start: 2021-08-09 | End: 2021-11-23 | Stop reason: SDUPTHER

## 2021-08-09 NOTE — TELEPHONE ENCOUNTER
Kory Arnold called requesting a refill on the following medications:  Requested Prescriptions     Pending Prescriptions Disp Refills    hydroCHLOROthiazide (HYDRODIURIL) 12.5 MG tablet 30 tablet 11     Sig: Take 1 tablet by mouth daily     Pharmacy verified: [facility] [address]  Mary [place]      Date of last visit: 5/20/2021  Date of next visit (if applicable): 05/09/6482    Pt requests a 90 day supply

## 2021-11-23 ENCOUNTER — OFFICE VISIT (OUTPATIENT)
Dept: CARDIOLOGY CLINIC | Age: 86
End: 2021-11-23
Payer: MEDICARE

## 2021-11-23 VITALS
DIASTOLIC BLOOD PRESSURE: 64 MMHG | BODY MASS INDEX: 24.84 KG/M2 | WEIGHT: 135 LBS | HEIGHT: 62 IN | HEART RATE: 72 BPM | SYSTOLIC BLOOD PRESSURE: 142 MMHG

## 2021-11-23 DIAGNOSIS — I10 PRIMARY HYPERTENSION: ICD-10-CM

## 2021-11-23 DIAGNOSIS — Q21.0 VSD (VENTRICULAR SEPTAL DEFECT AND AORTIC ARCH HYPOPLASIA: Primary | ICD-10-CM

## 2021-11-23 DIAGNOSIS — Q25.42 VSD (VENTRICULAR SEPTAL DEFECT AND AORTIC ARCH HYPOPLASIA: Primary | ICD-10-CM

## 2021-11-23 PROCEDURE — G8484 FLU IMMUNIZE NO ADMIN: HCPCS | Performed by: NUCLEAR MEDICINE

## 2021-11-23 PROCEDURE — 1036F TOBACCO NON-USER: CPT | Performed by: NUCLEAR MEDICINE

## 2021-11-23 PROCEDURE — 99213 OFFICE O/P EST LOW 20 MIN: CPT | Performed by: NUCLEAR MEDICINE

## 2021-11-23 PROCEDURE — 4040F PNEUMOC VAC/ADMIN/RCVD: CPT | Performed by: NUCLEAR MEDICINE

## 2021-11-23 PROCEDURE — 1123F ACP DISCUSS/DSCN MKR DOCD: CPT | Performed by: NUCLEAR MEDICINE

## 2021-11-23 PROCEDURE — G8427 DOCREV CUR MEDS BY ELIG CLIN: HCPCS | Performed by: NUCLEAR MEDICINE

## 2021-11-23 PROCEDURE — G8400 PT W/DXA NO RESULTS DOC: HCPCS | Performed by: NUCLEAR MEDICINE

## 2021-11-23 PROCEDURE — G8420 CALC BMI NORM PARAMETERS: HCPCS | Performed by: NUCLEAR MEDICINE

## 2021-11-23 PROCEDURE — 1090F PRES/ABSN URINE INCON ASSESS: CPT | Performed by: NUCLEAR MEDICINE

## 2021-11-23 RX ORDER — HYDROCHLOROTHIAZIDE 12.5 MG/1
12.5 TABLET ORAL DAILY
Qty: 90 TABLET | Refills: 3 | Status: SHIPPED | OUTPATIENT
Start: 2021-11-23

## 2021-11-23 NOTE — PROGRESS NOTES
[address]  [address]  [facility] [address]  Dept: [phone]  Dept Fax: [phone]  Loc: [phone]    Visit Date: 2021    Reggie Hernandez is a 81 y.o. female who presents todayfor:  Chief Complaint   Patient presents with    Check-Up    Coronary Artery Disease    Hypertension    Ventricular Septal Defect   some heavy leg feeling when she walks  Know VSD  Treated medically   No chest pain   Some baseline dyspnea  No changes in breathing  BP is stable   No dizziness  No syncope        HPI:  HPI  Past Medical History:   Diagnosis Date    Arthritis     CAD (coronary artery disease)     Heart murmur     Hyperlipidemia     Sleep apnea     Thyroid disease     Hypothyroid    VSD (ventricular septal defect)       Past Surgical History:   Procedure Laterality Date    BLADDER SUSPENSION       SECTION      x2    COLONOSCOPY      DILATATION, ESOPHAGUS      DOPPLER ECHOCARDIOGRAPHY  10-01-10    EF 45-50%    EYE SURGERY Bilateral     cataracts    HYSTERECTOMY      KNEE CARTILAGE SURGERY Right     OTHER SURGICAL HISTORY  2015    VAGINAL ANTERIOR AND POSTERIOR REPAIR , SOLYX BLADDER SLING, CYSTOSCOPY    SHOULDER SURGERY Right     SINUS SURGERY      TUBAL LIGATION      VENTRICULOPERITONEAL SHUNT Right 10/11/2016    Dr Cale Garcia     Family History   Problem Relation Age of Onset    Cancer Sister     Heart Disease Sister      Social History     Tobacco Use    Smoking status: Never Smoker    Smokeless tobacco: Never Used   Substance Use Topics    Alcohol use: No     Alcohol/week: 0.0 standard drinks      Current Outpatient Medications   Medication Sig Dispense Refill    Methylcellulose, Laxative, (CITRUCEL PO) Take by mouth      Probiotic Product (ALIGN PO) Take by mouth      Multiple Vitamins-Minerals (EYE VITAMINS PO) Take by mouth      hydroCHLOROthiazide (HYDRODIURIL) 12.5 MG tablet Take 1 tablet by mouth daily 30 tablet 11    ketoconazole (NIZORAL) 2 % cream Apply topically daily Apply topically daily.  hydrocortisone 0.5 % cream Apply topically 2 times daily Apply topically 2 times daily.  CPAP Machine MISC by Does not apply route Please change BIPAP pressure to 18/15 cm H20. 1 each 0    Multiple Vitamin (MULTI VITAMIN PO) Take by mouth daily      CALCIUM PO Take by mouth daily      metroNIDAZOLE (METROCREAM) 0.75 % cream daily  0    levothyroxine (SYNTHROID) 112 MCG tablet Take 112 mcg by mouth Daily.  simvastatin (ZOCOR) 10 MG tablet Take 10 mg by mouth nightly. No current facility-administered medications for this visit.      Allergies   Allergen Reactions    Iv Dye [Iodides]     Sulfa Antibiotics Hives    Iodine Rash     blisters     Health Maintenance   Topic Date Due    COVID-19 Vaccine (1) Never done    Shingles Vaccine (1 of 2) Never done    DEXA (modify frequency per FRAX score)  Never done    Pneumococcal 65+ years Vaccine (1 of 1 - PPSV23) Never done   [employer] Visit (AWV)  Never done    Flu vaccine (1) Never done    Lipid screen  07/07/2022    Potassium monitoring  07/07/2022    Creatinine monitoring  07/07/2022    TSH testing  08/16/2022    DTaP/Tdap/Td vaccine (2 - Td or Tdap) 09/24/2029    Hepatitis A vaccine  Aged Out    Hepatitis B vaccine  Aged Out    Hib vaccine  Aged Out    Meningococcal (ACWY) vaccine  Aged Out       Subjective:  Review of Systems  General:   No fever, no chills, No fatigue or weight loss  Pulmonary:    No dyspnea, no wheezing  Cardiac:    Denies recent chest pain,   GI:     No nausea or vomiting, no abdominal pain  Neuro:    No dizziness or light headedness,   Musculoskeletal:  No recent active issues  Extremities:   No edema, no obvious claudication       Objective:  Physical Exam  BP (!) 142/64   Pulse 72   Ht 5' 2\" (1.575 m)   Wt 135 lb (61.2 kg)   BMI 24.69 kg/m²   General:   Well developed, well nourished  Lungs:   Clear to auscultation  Heart:    Normal S1 S2, Slight murmur. no rubs, no gallops  Abdomen:   Soft, non tender, no organomegalies, positive bowel sounds  Extremities:   No edema, no cyanosis, good peripheral pulses  Neurological:   Awake, alert, oriented. No obvious focal deficits  Musculoskelatal:  No obvious deformities    Assessment:      Diagnosis Orders   1. VSD (ventricular septal defect and aortic arch hypoplasia     2. Primary hypertension     as above  Cardiac fair for now   ? back issues      Plan:  No follow-ups on file. Consider OIO   Continue risk factor modification and medical management  Thank you for allowing me to participate in the care of your patient. Please don't hesitate to contact me regarding any further issues related to the patient care    Orders Placed:  No orders of the defined types were placed in this encounter. Medications Prescribed:  No orders of the defined types were placed in this encounter. Discussed use, benefit, and side effects of prescribed medications. All patient questions answered. Pt voicedunderstanding. Instructed to continue current medications, diet and exercise. Continue risk factor modification and medical management. Patient agreed with treatment plan. Follow up as directed.     Electronically signedby Abril Rose MD on 11/23/2021 at 10:24 AM

## 2021-11-29 ENCOUNTER — HOSPITAL ENCOUNTER (OUTPATIENT)
Dept: CT IMAGING | Age: 86
Discharge: HOME | End: 2021-11-29
Payer: MEDICARE

## 2021-11-29 DIAGNOSIS — G91.2 (IDIOPATHIC) NORMAL PRESSURE HYDROCEPHALUS (HCC): ICD-10-CM

## 2021-11-29 PROCEDURE — 70450 CT HEAD/BRAIN W/O DYE: CPT

## 2022-04-28 ENCOUNTER — OFFICE VISIT (OUTPATIENT)
Dept: PULMONOLOGY | Age: 87
End: 2022-04-28
Payer: MEDICARE

## 2022-04-28 VITALS
DIASTOLIC BLOOD PRESSURE: 76 MMHG | SYSTOLIC BLOOD PRESSURE: 132 MMHG | HEIGHT: 62 IN | WEIGHT: 137.4 LBS | TEMPERATURE: 97.8 F | BODY MASS INDEX: 25.28 KG/M2 | HEART RATE: 62 BPM | OXYGEN SATURATION: 98 %

## 2022-04-28 DIAGNOSIS — G47.31 COMPLEX SLEEP APNEA SYNDROME: Primary | ICD-10-CM

## 2022-04-28 DIAGNOSIS — G47.10 HYPERSOMNIA: ICD-10-CM

## 2022-04-28 PROCEDURE — G8400 PT W/DXA NO RESULTS DOC: HCPCS | Performed by: PHYSICIAN ASSISTANT

## 2022-04-28 PROCEDURE — G8417 CALC BMI ABV UP PARAM F/U: HCPCS | Performed by: PHYSICIAN ASSISTANT

## 2022-04-28 PROCEDURE — 99214 OFFICE O/P EST MOD 30 MIN: CPT | Performed by: PHYSICIAN ASSISTANT

## 2022-04-28 PROCEDURE — 1036F TOBACCO NON-USER: CPT | Performed by: PHYSICIAN ASSISTANT

## 2022-04-28 PROCEDURE — 4040F PNEUMOC VAC/ADMIN/RCVD: CPT | Performed by: PHYSICIAN ASSISTANT

## 2022-04-28 PROCEDURE — 1123F ACP DISCUSS/DSCN MKR DOCD: CPT | Performed by: PHYSICIAN ASSISTANT

## 2022-04-28 PROCEDURE — G8427 DOCREV CUR MEDS BY ELIG CLIN: HCPCS | Performed by: PHYSICIAN ASSISTANT

## 2022-04-28 PROCEDURE — 1090F PRES/ABSN URINE INCON ASSESS: CPT | Performed by: PHYSICIAN ASSISTANT

## 2022-04-28 ASSESSMENT — ENCOUNTER SYMPTOMS
CHEST TIGHTNESS: 0
DIARRHEA: 0
EYES NEGATIVE: 1
SHORTNESS OF BREATH: 0
BACK PAIN: 0
STRIDOR: 0
COUGH: 0
ALLERGIC/IMMUNOLOGIC NEGATIVE: 1
WHEEZING: 0
NAUSEA: 0

## 2022-04-28 NOTE — PROGRESS NOTES
[facility]      Yvette Aponte         [id number]  4/28/2022   Chief Complaint   Patient presents with    Follow-up     1 year brinda        Pt of Amy Pelayo    PAP Download:   Original or initial AHI: 50.3     Date of initial study: 11/21/13      Compliant  90%     Noncompliant 10 %     PAP Type bi-flex    Level  15/18   Avg Hrs/Day 6hr 1min 33sec  AHI: 9.1   Recorded compliance dates : 3/29/22-4/27/22  Machine/Mfg:   [] ResMed    [x] Respironics/Dreamstation   Interface:   [] Nasal    [] Nasal pillows   [x] FFM      Provider:      [x] [id number]     []Christina     [] Keri    [] Marshall Kennedy    [] Delgado               [] [facility]      [] Adaptive    [] [address]      [] Other    Neck Size: 14  Mallampati 3  ESS:  17  SAQLI: 69    Here is a scan of the most recent download:            Presentation:   Aurea Todd presents for sleep medicine follow up for complex sleep apnea. Since the last visit, Aurea Todd is doing well with PAP. She sleeps well most night. AHI is elevated. PAP is not downloading detailed info and pressure not working correct. Mask is leaking. She does get tired. Equipment issues: The pressure is  acceptable, the mask is acceptable     Sleep issues:  Do you feel better? Yes  More rested? Yes   Better concentration? yes    Progress History:   Since last visit any new medical issues? Yes plantar fascitis   New ER or hospital visits? No  Any new or changes in medicines? No  Any new sleep medicines? No    Review of Systems -   Review of Systems   Constitutional: Negative for activity change, appetite change, chills and fever. HENT: Negative for congestion and postnasal drip. Eyes: Negative. Respiratory: Negative for cough, chest tightness, shortness of breath, wheezing and stridor. Cardiovascular: Negative for chest pain and leg swelling. Gastrointestinal: Negative for diarrhea and nausea. Endocrine: Negative. Genitourinary: Negative. Musculoskeletal: Negative. Negative for arthralgias and back pain. Skin: Negative. Allergic/Immunologic: Negative. Neurological: Negative. Negative for dizziness and light-headedness. Psychiatric/Behavioral: Negative. All other systems reviewed and are negative. Physical Exam:    BMI:  Body mass index is 25.13 kg/m². Wt Readings from Last 3 Encounters:   04/28/22 137 lb 6.4 oz (62.3 kg)   11/23/21 135 lb (61.2 kg)   05/20/21 132 lb (59.9 kg)     Weight stable / unchanged  Vitals: /76 (Site: Left Upper Arm, Position: Sitting, Cuff Size: Medium Adult)   Pulse 62   Temp 97.8 °F (36.6 °C) (Oral)   Ht 5' 2\" (1.575 m)   Wt 137 lb 6.4 oz (62.3 kg)   SpO2 98%   BMI 25.13 kg/m²       Physical Exam  Constitutional:       Appearance: Normal appearance. She is normal weight. HENT:      Head: Normocephalic and atraumatic. Right Ear: External ear normal.      Left Ear: External ear normal.      Nose: Nose normal.   Eyes:      Extraocular Movements: Extraocular movements intact. Conjunctiva/sclera: Conjunctivae normal.      Pupils: Pupils are equal, round, and reactive to light. Pulmonary:      Effort: Pulmonary effort is normal.   Musculoskeletal:      Cervical back: Normal range of motion and neck supple. Neurological:      General: No focal deficit present. Mental Status: She is alert and oriented to person, place, and time. Psychiatric:         Attention and Perception: Attention and perception normal.         Mood and Affect: Mood and affect normal.         Speech: Speech normal.         Behavior: Behavior normal. Behavior is cooperative. Thought Content: Thought content normal.         Cognition and Memory: Cognition normal.         Judgment: Judgment normal.           ASSESSMENT/DIAGNOSIS     Diagnosis Orders   1. Complex sleep apnea syndrome     2. Hypersomnia            Plan   Do you need any equipment today? Yes She  needs replacement machine.   Their machine is obsolete and loud. - Will get new PAP, her pressure is not controlling AHI and unable to get complete detailed download  - Suspect her AHI will improve with new PAP  - Download reviewed and discussed with patient  - She  was advised to continue current positive airway pressure therapy with above described pressure. - She  advised to keep good compliance with current recommended pressure to get optimal results and clinical improvement  - Recommend 7-9 hours of sleep with PAP  - She was advised to call [employer] regarding supplies if needed.   -She call my office for earlier appointment if needed for worsening of sleep symptoms.   - She was instructed on weight loss  - Toña Mohr was educated about my impression and plan. Patient verbalizesunderstanding.   We will see Dhara Shea back in: 4 months with download    Information added by my medical assistant/LPN was reviewed today         Daniel Bueno PA-C, MPAS  4/28/2022

## 2022-07-20 ENCOUNTER — HOSPITAL ENCOUNTER (OUTPATIENT)
Dept: GENERAL RADIOLOGY | Age: 87
Discharge: HOME | End: 2022-07-20
Payer: MEDICARE

## 2022-07-20 ENCOUNTER — HOSPITAL ENCOUNTER (OUTPATIENT)
Age: 87
Discharge: HOME | End: 2022-07-20
Payer: MEDICARE

## 2022-07-20 DIAGNOSIS — M25.552 LEFT HIP PAIN: ICD-10-CM

## 2022-07-20 DIAGNOSIS — R52 PAIN: ICD-10-CM

## 2022-07-20 PROCEDURE — 72100 X-RAY EXAM L-S SPINE 2/3 VWS: CPT

## 2022-07-20 PROCEDURE — 73502 X-RAY EXAM HIP UNI 2-3 VIEWS: CPT

## 2022-09-07 ENCOUNTER — HOSPITAL ENCOUNTER (EMERGENCY)
Age: 87
Discharge: HOME | End: 2022-09-07
Attending: NURSE PRACTITIONER
Payer: MEDICARE

## 2022-09-07 VITALS
SYSTOLIC BLOOD PRESSURE: 140 MMHG | DIASTOLIC BLOOD PRESSURE: 60 MMHG | BODY MASS INDEX: 23.92 KG/M2 | TEMPERATURE: 97.2 F | WEIGHT: 130 LBS | OXYGEN SATURATION: 99 % | HEIGHT: 62 IN | RESPIRATION RATE: 16 BRPM | HEART RATE: 72 BPM

## 2022-09-07 DIAGNOSIS — S81.812A LACERATION OF LEFT LOWER EXTREMITY, INITIAL ENCOUNTER: Primary | ICD-10-CM

## 2022-09-07 PROCEDURE — 99213 OFFICE O/P EST LOW 20 MIN: CPT | Performed by: NURSE PRACTITIONER

## 2022-09-07 PROCEDURE — 6360000002 HC RX W HCPCS: Performed by: NURSE PRACTITIONER

## 2022-09-07 PROCEDURE — 99214 OFFICE O/P EST MOD 30 MIN: CPT

## 2022-09-07 PROCEDURE — 12004 RPR S/N/AX/GEN/TRK7.6-12.5CM: CPT | Performed by: NURSE PRACTITIONER

## 2022-09-07 PROCEDURE — 90715 TDAP VACCINE 7 YRS/> IM: CPT | Performed by: NURSE PRACTITIONER

## 2022-09-07 PROCEDURE — 90471 IMMUNIZATION ADMIN: CPT | Performed by: NURSE PRACTITIONER

## 2022-09-07 PROCEDURE — 6370000000 HC RX 637 (ALT 250 FOR IP): Performed by: NURSE PRACTITIONER

## 2022-09-07 PROCEDURE — 2500000003 HC RX 250 WO HCPCS: Performed by: NURSE PRACTITIONER

## 2022-09-07 RX ORDER — LIDOCAINE HYDROCHLORIDE AND EPINEPHRINE BITARTRATE 20; .01 MG/ML; MG/ML
20 INJECTION, SOLUTION SUBCUTANEOUS ONCE
Status: COMPLETED | OUTPATIENT
Start: 2022-09-07 | End: 2022-09-07

## 2022-09-07 RX ORDER — GINSENG 100 MG
CAPSULE ORAL ONCE
Status: COMPLETED | OUTPATIENT
Start: 2022-09-07 | End: 2022-09-07

## 2022-09-07 RX ORDER — CEPHALEXIN 500 MG/1
500 CAPSULE ORAL 4 TIMES DAILY
Qty: 28 CAPSULE | Refills: 0 | Status: SHIPPED | OUTPATIENT
Start: 2022-09-07 | End: 2022-09-14

## 2022-09-07 RX ADMIN — LIDOCAINE HYDROCHLORIDE AND EPINEPHRINE 20 ML: 20; 10 INJECTION, SOLUTION INFILTRATION; PERINEURAL at 14:30

## 2022-09-07 RX ADMIN — TETANUS TOXOID, REDUCED DIPHTHERIA TOXOID AND ACELLULAR PERTUSSIS VACCINE, ADSORBED 0.5 ML: 5; 2.5; 8; 8; 2.5 SUSPENSION INTRAMUSCULAR at 14:31

## 2022-09-07 RX ADMIN — BACITRACIN: 500 OINTMENT TOPICAL at 14:30

## 2022-09-07 ASSESSMENT — ENCOUNTER SYMPTOMS
RESPIRATORY NEGATIVE: 1
EYES NEGATIVE: 1
ALLERGIC/IMMUNOLOGIC NEGATIVE: 1
GASTROINTESTINAL NEGATIVE: 1

## 2022-09-07 ASSESSMENT — PAIN - FUNCTIONAL ASSESSMENT: PAIN_FUNCTIONAL_ASSESSMENT: 0-10

## 2022-09-07 ASSESSMENT — PAIN DESCRIPTION - LOCATION: LOCATION: LEG

## 2022-09-07 ASSESSMENT — PAIN DESCRIPTION - DESCRIPTORS: DESCRIPTORS: ACHING;DISCOMFORT

## 2022-09-07 ASSESSMENT — PAIN SCALES - GENERAL: PAINLEVEL_OUTOF10: 6

## 2022-09-07 NOTE — ED PROVIDER NOTES
[address]       Chief Complaint   Patient presents with    Laceration     Laceration to right calf. Patient states she was cutting branches off of a tree and lost her footing and fell backwards onto a branch. States she did not hit her head or lose consciousness. Nurses Notes reviewed and I agree except as noted in the HPI. HISTORY OF PRESENT ILLNESS   Chioma Stephen is a 81 y.o. female who presents to urgent care today complaining of a wound to the left calf. She states that she was helping clear a fallen tree and tripped backwards striking the back of her calf on a stump sticking out of the ground. She denies loss of consciousness. She denies any other injury. She she denies any head injury. REVIEW OF SYSTEMS     Review of Systems   Constitutional: Negative. Eyes: Negative. Respiratory: Negative. Cardiovascular: Negative. Gastrointestinal: Negative. Endocrine: Negative. Genitourinary: Negative. Skin:         Puncture/laceration to the left calf. Allergic/Immunologic: Negative. Neurological: Negative. Hematological: Negative. Psychiatric/Behavioral: Negative. PAST MEDICAL HISTORY         Diagnosis Date    Arthritis     CAD (coronary artery disease)     Heart murmur     Hyperlipidemia     Sleep apnea     Thyroid disease     Hypothyroid    VSD (ventricular septal defect)        SURGICAL HISTORY     Patient  has a past surgical history that includes doppler echocardiography (10-01-10); eye surgery (Bilateral); bladder suspension (); Knee cartilage surgery (Right); shoulder surgery (Right);  section; Tubal ligation (); Hysterectomy (); Colonoscopy; Dilatation, esophagus; other surgical history (2015); Ventriculoperitoneal shunt (Right, 10/11/2016); and sinus surgery.     CURRENT MEDICATIONS       Discharge Medication List as of 2022  2:36 PM        CONTINUE these normal.      Breath sounds: Normal breath sounds. Musculoskeletal:         General: Normal range of motion. Comments: Patient has full range of motion of the left lower extremity. Palpable pedal posttibial pulses. Refill less than 2 seconds. Skin:     Capillary Refill: Capillary refill takes less than 2 seconds. Comments: \"V\" shaped 8.5 cm laceration to the lateral posterior left lower extremity. Neurological:      Mental Status: She is alert. Psychiatric:         Mood and Affect: Mood normal.       DIAGNOSTIC RESULTS   Labs: No results found for this visit on 09/07/22.     IMAGING:  No orders to display     URGENT CARE COURSE:     Vitals:    09/07/22 1339 09/07/22 1455   BP: 137/66 (!) 140/60   Pulse: 80 72   Resp: 16 16   Temp: 97.3 °F (36.3 °C) 97.2 °F (36.2 °C)   TempSrc: Temporal Temporal   SpO2: 99%    Weight: 130 lb (59 kg)    Height: 5' 2\" (1.575 m)        Medications   lidocaine-EPINEPHrine 2 percent-1:[id number] injection 20 mL (20 mLs IntraDERmal Given 9/7/22 1430)   Tetanus-Diphth-Acell Pertussis (BOOSTRIX) injection 0.5 mL (0.5 mLs IntraMUSCular Given 9/7/22 1431)   bacitracin ointment ( Topical Given 9/7/22 1430)     PROCEDURES:    Lac Repair    Date/Time: 9/7/2022 2:29 PM  Performed by: WILBER Yuen CNP  Authorized by: WILBER Yuen CNP     Consent:     Consent obtained:  Verbal    Consent given by:  Patient    Risks, benefits, and alternatives were discussed: yes      Risks discussed:  Infection  Universal protocol:     Procedure explained and questions answered to patient or proxy's satisfaction: yes      Relevant documents present and verified: yes      Test results available: no      Imaging studies available: no      Required blood products, implants, devices, and special equipment available: no      Site/side marked: no      Immediately prior to procedure, a time out was called: no      Patient identity confirmed:  Verbally with patient, hospital-assigned identification number and arm band  Anesthesia:     Anesthesia method:  Local infiltration    Local anesthetic:  Lidocaine 1% w/o epi  Laceration details:     Location:  Leg    Leg location:  L lower leg  Exploration:     Limited defect created (wound extended): no      Imaging outcome: foreign body not noted      Wound exploration: wound explored through full range of motion and entire depth of wound visualized      Wound extent: underlying fracture      Wound extent: no fascia violation noted, no foreign bodies/material noted, no muscle damage noted, no nerve damage noted and no tendon damage noted    Treatment:     Area cleansed with:  Shur-Clens and soap and water    Amount of cleaning:  Extensive    Irrigation solution:  Sterile water    Irrigation method:  Syringe    Debridement:  Minimal    Undermining:  None    Scar revision: no      Layers/structures repaired:  Deep subcutaneous  Deep subcutaneous:     Suture size:  6-0    Suture material:  Vicryl    Suture technique:  Simple interrupted    Number of sutures:  2  Skin repair:     Repair method:  Sutures    Suture size:  4-0    Suture material:  Nylon    Suture technique:  Simple interrupted    Number of sutures:  16  Approximation:     Approximation:  Close  Repair type:     Repair type: Intermediate  Post-procedure details:     Dressing:  Antibiotic ointment and bulky dressing    Procedure completion:  Tolerated well, no immediate complications     Laceration length 8.5cm         None  FINALIMPRESSION      1. Laceration of left lower extremity, initial encounter        DISPOSITION/PLAN   DISPOSITION Decision To Discharge 09/07/2022 02:34:37 PM    Puncture/laceration to the left posterior lower extremity. Please see procedure note. Will start on cephalexin 4 times daily x5 days. She was advised this may take 10 to 14 days until sutures can be removed. Denies any questions.   She was advised to please return with any signs and symptoms of infection such as redness, swelling, red streaks. Drainage. PATIENT REFERRED TO:  No follow-up provider specified.   DISCHARGE MEDICATIONS:  Discharge Medication List as of 9/7/2022  2:36 PM        START taking these medications    Details   cephALEXin (KEFLEX) 500 MG capsule Take 1 capsule by mouth 4 times daily for 7 days, Disp-28 capsule, R-0Normal           Discharge Medication List as of 9/7/2022  2:36 PM          WILBER Farias - ANAMARIA Stovall, WILBER - CNP  09/07/22 Luci [address], WILBER - CNP  09/13/22 1147

## 2022-09-07 NOTE — ED NOTES
Bacitracin dressing applied with guaze and coban to right calf.       Jensen Corbin LPN  97/34/96 0287

## 2022-09-07 NOTE — ED NOTES
Patient declined wheelchair on discharge. Ambulated with  to lobby.       Teo Woods LPN  05/15/16 [id number]

## 2022-09-07 NOTE — ED TRIAGE NOTES
Patient ambulated to room with . Laceration to right calf. Patient states she was cutting branches off of a tree and lost her footing and fell backwards onto a branch. States she did not hit her head or lose consciousness.

## 2022-09-19 ENCOUNTER — HOSPITAL ENCOUNTER (EMERGENCY)
Age: 87
Discharge: HOME | End: 2022-09-19
Payer: MEDICARE

## 2022-09-19 VITALS
SYSTOLIC BLOOD PRESSURE: 127 MMHG | TEMPERATURE: 97 F | HEIGHT: 62 IN | WEIGHT: 130 LBS | RESPIRATION RATE: 16 BRPM | HEART RATE: 76 BPM | OXYGEN SATURATION: 98 % | BODY MASS INDEX: 23.92 KG/M2 | DIASTOLIC BLOOD PRESSURE: 62 MMHG

## 2022-09-19 DIAGNOSIS — S81.812D LACERATION OF LEFT LOWER LEG WITH INFECTION, SUBSEQUENT ENCOUNTER: Primary | ICD-10-CM

## 2022-09-19 DIAGNOSIS — L08.9 LACERATION OF LEFT LOWER LEG WITH INFECTION, SUBSEQUENT ENCOUNTER: Primary | ICD-10-CM

## 2022-09-19 PROCEDURE — 99213 OFFICE O/P EST LOW 20 MIN: CPT

## 2022-09-19 PROCEDURE — 99212 OFFICE O/P EST SF 10 MIN: CPT | Performed by: NURSE PRACTITIONER

## 2022-09-19 PROCEDURE — 96372 THER/PROPH/DIAG INJ SC/IM: CPT

## 2022-09-19 PROCEDURE — 6360000002 HC RX W HCPCS: Performed by: NURSE PRACTITIONER

## 2022-09-19 PROCEDURE — 2500000003 HC RX 250 WO HCPCS: Performed by: NURSE PRACTITIONER

## 2022-09-19 RX ORDER — CLINDAMYCIN HYDROCHLORIDE 300 MG/1
300 CAPSULE ORAL 2 TIMES DAILY
Qty: 14 CAPSULE | Refills: 0 | Status: SHIPPED | OUTPATIENT
Start: 2022-09-19 | End: 2022-09-26

## 2022-09-19 RX ADMIN — LIDOCAINE HYDROCHLORIDE 1000 MG: 10 INJECTION, SOLUTION EPIDURAL; INFILTRATION; INTRACAUDAL; PERINEURAL at 09:41

## 2022-09-19 ASSESSMENT — ENCOUNTER SYMPTOMS
APNEA: 0
SHORTNESS OF BREATH: 0
CHEST TIGHTNESS: 0
STRIDOR: 0
CHOKING: 0
WHEEZING: 0
COUGH: 0
COLOR CHANGE: 1

## 2022-09-19 ASSESSMENT — PAIN - FUNCTIONAL ASSESSMENT: PAIN_FUNCTIONAL_ASSESSMENT: NONE - DENIES PAIN

## 2022-09-19 NOTE — ED PROVIDER NOTES
(Right, 10/11/2016); and sinus surgery. CURRENT MEDICATIONS       Previous Medications    CALCIUM PO    Take by mouth daily    CPAP MACHINE MISC    by Does not apply route Please change BIPAP pressure to 18/15 cm H20.    HYDROCHLOROTHIAZIDE (HYDRODIURIL) 12.5 MG TABLET    Take 1 tablet by mouth daily    HYDROCORTISONE 0.5 % CREAM    Apply topically 2 times daily Apply topically 2 times daily. KETOCONAZOLE (NIZORAL) 2 % CREAM    Apply topically daily Apply topically daily. LEVOTHYROXINE (SYNTHROID) 112 MCG TABLET    Take 112 mcg by mouth Daily. METHYLCELLULOSE, LAXATIVE, (CITRUCEL PO)    Take by mouth    MULTIPLE VITAMIN (MULTI VITAMIN PO)    Take by mouth daily    MULTIPLE VITAMINS-MINERALS (EYE VITAMINS PO)    Take by mouth    PROBIOTIC PRODUCT (ALIGN PO)    Take by mouth    SIMVASTATIN (ZOCOR) 10 MG TABLET    Take 10 mg by mouth nightly. ALLERGIES     Patient is is allergic to iv dye [iodides], sulfa antibiotics, and iodine. FAMILY HISTORY     Patient's family history includes Cancer in her sister; Heart Disease in her sister. SOCIAL HISTORY     Patient  reports that she has never smoked. She has never used smokeless tobacco. She reports that she does not drink alcohol and does not use drugs. PHYSICAL EXAM     ED TRIAGE VITALS  BP: 127/62, Temp: 97 °F (36.1 °C), Heart Rate: 76, Resp: 16, SpO2: 98 %  Physical Exam  Vitals and nursing note reviewed. Constitutional:       General: She is not in acute distress. Appearance: Normal appearance. She is normal weight. She is not ill-appearing, toxic-appearing or diaphoretic. HENT:      Head: Normocephalic and atraumatic. Right Ear: External ear normal.      Left Ear: External ear normal.   Eyes:      Extraocular Movements: Extraocular movements intact. Conjunctiva/sclera: Conjunctivae normal.   Pulmonary:      Effort: Pulmonary effort is normal.   Musculoskeletal:         General: Swelling and tenderness present.       Cervical back: Normal range of motion. Legs:    Skin:     General: Skin is warm. Neurological:      General: No focal deficit present. Mental Status: She is alert and oriented to person, place, and time. Psychiatric:         Mood and Affect: Mood normal.         Behavior: Behavior normal.         Thought Content: Thought content normal.         Judgment: Judgment normal.       DIAGNOSTIC RESULTS   Labs:No results found for this visit on 09/19/22. IMAGING:  No orders to display     URGENT CARE COURSE:     Vitals:    09/19/22 0921   BP: 127/62   Pulse: 76   Resp: 16   Temp: 97 °F (36.1 °C)   TempSrc: Temporal   SpO2: 98%   Weight: 130 lb (59 kg)   Height: 5' 2\" (1.575 m)       Medications   cefTRIAXone (ROCEPHIN) 1,000 mg in lidocaine 1 % 2.86 mL IM Injection (1,000 mg IntraMUSCular Given 9/19/22 0941)     PROCEDURES:  None  FINAL IMPRESSION      1. Laceration of left lower leg with infection, subsequent encounter        DISPOSITION/PLAN   Decision To Discharge     The patient/Patient representative was advised to rest at home and elevate the affected area. Patient/representative was also advised to apply warm compresses to the affected area up to 15/20 minutes at a time up to 4 times a day. Patient/Patient representative is also advised to monitor any changes such as increasing redness, pain, development of fever or chills, generalized body aches. The patient will be given medication and is advised to take as directed. If the patient develops any changes such as fever not relieved with Motrin and Tylenol chest pain or shortness of breath patient is to dial 911 or go directly to the emergency room for reevaluation and further management of care. The patient does not experience any of these symptoms or concerns. Follow-up with her primary care provider in 2-3 days for reevaluation.   The patient/Patient representatives are agreeable to treatment plan at this time and the patient left in stable condition.          PATIENT REFERRED TO:  Junior Salmon MD  [address] [id number] [phone]    Schedule an appointment as soon as possible for a visit   For wound re-check    [facility]  [address] [facility]  Go today  If symptoms worsen    DISCHARGE MEDICATIONS:  New Prescriptions    CLINDAMYCIN (CLEOCIN) 300 MG CAPSULE    Take 1 capsule by mouth 2 times daily for 7 days     Current Discharge Medication List          WILBER Lucio 34, WILBER - ANAMARIA  09/19/22 [id number]

## 2022-09-19 NOTE — ED NOTES
Pt presents to [facility] for c/o needing her stitches on her right lower leg looked at.  Pt states she has been experiencing redness and drainage x 4 days     Cem Villarreal LPN  43/97/07 8113

## 2022-11-15 ENCOUNTER — OFFICE VISIT (OUTPATIENT)
Dept: PULMONOLOGY | Age: 87
End: 2022-11-15
Payer: MEDICARE

## 2022-11-15 VITALS
BODY MASS INDEX: 24.66 KG/M2 | SYSTOLIC BLOOD PRESSURE: 124 MMHG | TEMPERATURE: 97.3 F | HEIGHT: 62 IN | WEIGHT: 134 LBS | DIASTOLIC BLOOD PRESSURE: 76 MMHG | OXYGEN SATURATION: 96 % | HEART RATE: 75 BPM

## 2022-11-15 DIAGNOSIS — G47.31 COMPLEX SLEEP APNEA SYNDROME: Primary | ICD-10-CM

## 2022-11-15 DIAGNOSIS — G47.10 HYPERSOMNIA: ICD-10-CM

## 2022-11-15 PROCEDURE — G8427 DOCREV CUR MEDS BY ELIG CLIN: HCPCS | Performed by: PHYSICIAN ASSISTANT

## 2022-11-15 PROCEDURE — G8420 CALC BMI NORM PARAMETERS: HCPCS | Performed by: PHYSICIAN ASSISTANT

## 2022-11-15 PROCEDURE — 1036F TOBACCO NON-USER: CPT | Performed by: PHYSICIAN ASSISTANT

## 2022-11-15 PROCEDURE — 1090F PRES/ABSN URINE INCON ASSESS: CPT | Performed by: PHYSICIAN ASSISTANT

## 2022-11-15 PROCEDURE — 99213 OFFICE O/P EST LOW 20 MIN: CPT | Performed by: PHYSICIAN ASSISTANT

## 2022-11-15 PROCEDURE — 1123F ACP DISCUSS/DSCN MKR DOCD: CPT | Performed by: PHYSICIAN ASSISTANT

## 2022-11-15 PROCEDURE — G8484 FLU IMMUNIZE NO ADMIN: HCPCS | Performed by: PHYSICIAN ASSISTANT

## 2022-11-15 ASSESSMENT — ENCOUNTER SYMPTOMS
DIARRHEA: 0
CHEST TIGHTNESS: 0
SHORTNESS OF BREATH: 0
ALLERGIC/IMMUNOLOGIC NEGATIVE: 1
WHEEZING: 0
COUGH: 0
NAUSEA: 0
BACK PAIN: 0
EYES NEGATIVE: 1
STRIDOR: 0

## 2022-11-28 ENCOUNTER — OFFICE VISIT (OUTPATIENT)
Dept: CARDIOLOGY CLINIC | Age: 87
End: 2022-11-28
Payer: MEDICARE

## 2022-11-28 VITALS
SYSTOLIC BLOOD PRESSURE: 134 MMHG | DIASTOLIC BLOOD PRESSURE: 64 MMHG | HEART RATE: 70 BPM | WEIGHT: 132 LBS | BODY MASS INDEX: 24.29 KG/M2 | HEIGHT: 62 IN

## 2022-11-28 DIAGNOSIS — I25.10 CORONARY ARTERY DISEASE INVOLVING NATIVE CORONARY ARTERY OF NATIVE HEART WITHOUT ANGINA PECTORIS: Primary | ICD-10-CM

## 2022-11-28 DIAGNOSIS — Q21.0 VSD (VENTRICULAR SEPTAL DEFECT AND AORTIC ARCH HYPOPLASIA: ICD-10-CM

## 2022-11-28 DIAGNOSIS — Q25.42 VSD (VENTRICULAR SEPTAL DEFECT AND AORTIC ARCH HYPOPLASIA: ICD-10-CM

## 2022-11-28 PROCEDURE — 1036F TOBACCO NON-USER: CPT | Performed by: NUCLEAR MEDICINE

## 2022-11-28 PROCEDURE — 1090F PRES/ABSN URINE INCON ASSESS: CPT | Performed by: NUCLEAR MEDICINE

## 2022-11-28 PROCEDURE — 93000 ELECTROCARDIOGRAM COMPLETE: CPT | Performed by: NUCLEAR MEDICINE

## 2022-11-28 PROCEDURE — G8420 CALC BMI NORM PARAMETERS: HCPCS | Performed by: NUCLEAR MEDICINE

## 2022-11-28 PROCEDURE — G8427 DOCREV CUR MEDS BY ELIG CLIN: HCPCS | Performed by: NUCLEAR MEDICINE

## 2022-11-28 PROCEDURE — 99213 OFFICE O/P EST LOW 20 MIN: CPT | Performed by: NUCLEAR MEDICINE

## 2022-11-28 PROCEDURE — 1123F ACP DISCUSS/DSCN MKR DOCD: CPT | Performed by: NUCLEAR MEDICINE

## 2022-11-28 PROCEDURE — G8484 FLU IMMUNIZE NO ADMIN: HCPCS | Performed by: NUCLEAR MEDICINE

## 2022-11-28 RX ORDER — ESTRADIOL 0.1 MG/G
2 CREAM VAGINAL DAILY
COMMUNITY

## 2022-11-28 RX ORDER — HYDROCHLOROTHIAZIDE 12.5 MG/1
12.5 TABLET ORAL DAILY
Qty: 90 TABLET | Refills: 3 | Status: SHIPPED | OUTPATIENT
Start: 2022-11-28

## 2022-11-28 NOTE — PROGRESS NOTES
[id number] [address] [facility] [address]  [address] [place] [address]  Dept: [phone]  Dept Fax: [phone]  Loc: [phone]    Visit Date: 2022    Shea Pitt is a 81 y.o. female who presents todayfor:  Chief Complaint   Patient presents with    Check-Up    Coronary Artery Disease    Ventricular Septal Defect           Hypertension   Known VSD  Mild CAD  No chest pain   No changes in breathing  Bp is stable  Some baseline dyspnea  NO DIZZINESS    No syncope       HPI:  HPI  Past Medical History:   Diagnosis Date    Arthritis     CAD (coronary artery disease)     Heart murmur     Hyperlipidemia     Sleep apnea     Thyroid disease     Hypothyroid    VSD (ventricular septal defect)       Past Surgical History:   Procedure Laterality Date    BLADDER SUSPENSION       SECTION      x2    COLONOSCOPY      DILATATION, ESOPHAGUS      DOPPLER ECHOCARDIOGRAPHY  10-01-10    EF 45-50%    EYE SURGERY Bilateral     cataracts    HYSTERECTOMY (CERVIX STATUS UNKNOWN)      KNEE CARTILAGE SURGERY Right     OTHER SURGICAL HISTORY  2015    VAGINAL ANTERIOR AND POSTERIOR REPAIR , SOLYX BLADDER SLING, CYSTOSCOPY    SHOULDER SURGERY Right     SINUS SURGERY      TUBAL LIGATION      VENTRICULOPERITONEAL SHUNT Right 10/11/2016    Dr Ramos Jacinto     Family History   Problem Relation Age of Onset    Cancer Sister     Heart Disease Sister      Social History     Tobacco Use    Smoking status: Never    Smokeless tobacco: Never   Substance Use Topics    Alcohol use: No     Alcohol/week: 0.0 standard drinks      Current Outpatient Medications   Medication Sig Dispense Refill    estradiol (ESTRACE) 0.1 MG/GM vaginal cream Place 2 g vaginally daily      Methylcellulose, Laxative, (CITRUCEL PO) Take by mouth      Probiotic Product (ALIGN PO) Take by mouth      Multiple Vitamins-Minerals (EYE VITAMINS PO) Take by mouth      hydroCHLOROthiazide (HYDRODIURIL) 12.5 MG tablet Take 1 tablet by mouth daily 90 tablet 3    ketoconazole (NIZORAL) 2 % cream Apply topically daily Apply topically daily. hydrocortisone 0.5 % cream Apply topically 2 times daily Apply topically 2 times daily. CPAP Machine MISC by Does not apply route Please change BIPAP pressure to 18/15 cm H20. 1 each 0    Multiple Vitamin (MULTI VITAMIN PO) Take by mouth daily      CALCIUM PO Take by mouth daily      levothyroxine (SYNTHROID) 112 MCG tablet Take 112 mcg by mouth Daily. simvastatin (ZOCOR) 10 MG tablet Take 10 mg by mouth nightly. No current facility-administered medications for this visit. Allergies   Allergen Reactions    Clindamycin/Lincomycin Hives    Iv Dye [Iodides]     Sulfa Antibiotics Hives    Iodine Rash     blisters     Health Maintenance   Topic Date Due    COVID-19 Vaccine (1) Never done    Depression Screen  Never done    Shingles vaccine (1 of 2) Never done    Pneumococcal 65+ years Vaccine (1 - PCV) Never done    Annual Wellness Visit (AWV)  Never done    Flu vaccine (1) Never done    Lipids  07/15/2023    DTaP/Tdap/Td vaccine (3 - Td or Tdap) 09/07/2032    Hepatitis A vaccine  Aged Out    Hib vaccine  Aged Out    Meningococcal (ACWY) vaccine  Aged Out       Subjective:  Review of Systems  General:   No fever, no chills, No fatigue or weight loss  Pulmonary:    No dyspnea, no wheezing  Cardiac:    Denies recent chest pain,   GI:     No nausea or vomiting, no abdominal pain  Neuro:    No dizziness or light headedness,   Musculoskeletal:  No recent active issues  Extremities:   No edema, no obvious claudication     Objective:  Physical Exam  /64   Pulse 70   Ht 5' 2\" (1.575 m)   Wt 132 lb (59.9 kg)   BMI 24.14 kg/m²   General:   Well developed, well nourished  Lungs:   Clear to auscultation  Heart:    Normal S1 S2, Slight murmur.  no rubs, no gallops  Abdomen:   Soft, non tender, no organomegalies, positive bowel sounds  Extremities:   No edema, no cyanosis, good peripheral pulses  Neurological:   Awake, alert, oriented. No obvious focal deficits  Musculoskelatal:  No obvious deformities    Assessment:      Diagnosis Orders   1. Coronary artery disease involving native coronary artery of native heart without angina pectoris  EKG 12 Lead      2. VSD (ventricular septal defect and aortic arch hypoplasia  EKG 12 Lead      As above  Cardiac fair for now   ECG in office was done today. I reviewed the ECG. No acute findings    Plan:  No follow-ups on file. As above  Continue risk factor modification and medical management  Thank you for allowing me to participate in the care of your patient. Please don't hesitate to contact me regarding any further issues related to the patient care    Orders Placed:  Orders Placed This Encounter   Procedures    EKG 12 Lead     Order Specific Question:   Reason for Exam?     Answer: Other       Medications Prescribed:  No orders of the defined types were placed in this encounter. Discussed use, benefit, and side effects of prescribed medications. All patient questions answered. Pt voicedunderstanding. Instructed to continue current medications, diet and exercise. Continue risk factor modification and medical management. Patient agreed with treatment plan. Follow up as directed.     Electronically signedby Zahra Dao MD on 11/28/2022 at 11:21 AM

## 2023-03-16 ENCOUNTER — HOSPITAL ENCOUNTER (EMERGENCY)
Age: 88
Discharge: HOME | End: 2023-03-16
Payer: MEDICARE

## 2023-03-16 VITALS
TEMPERATURE: 97.4 F | DIASTOLIC BLOOD PRESSURE: 60 MMHG | HEIGHT: 62 IN | OXYGEN SATURATION: 98 % | WEIGHT: 132 LBS | SYSTOLIC BLOOD PRESSURE: 129 MMHG | HEART RATE: 87 BPM | RESPIRATION RATE: 14 BRPM | BODY MASS INDEX: 24.29 KG/M2

## 2023-03-16 DIAGNOSIS — N39.0 UTI (URINARY TRACT INFECTION), UNCOMPLICATED: Primary | ICD-10-CM

## 2023-03-16 LAB
BILIRUB UR STRIP.AUTO-MCNC: NEGATIVE MG/DL
CHARACTER UR: CLEAR
COLOR: YELLOW
GLUCOSE UR QL STRIP.AUTO: NEGATIVE MG/DL
KETONES UR QL STRIP.AUTO: NEGATIVE
NITRITE UR QL STRIP.AUTO: NEGATIVE
PH UR STRIP.AUTO: 5 [PH] (ref 5–9)
PROT UR STRIP.AUTO-MCNC: NEGATIVE MG/DL
RBC #/AREA URNS HPF: NEGATIVE /[HPF]
SP GR UR STRIP.AUTO: 1.01 (ref 1–1.03)
UROBILINOGEN, URINE: 0.2 EU/DL (ref 0.2–1)
WBC #/AREA URNS HPF: ABNORMAL /[HPF]

## 2023-03-16 PROCEDURE — 99213 OFFICE O/P EST LOW 20 MIN: CPT

## 2023-03-16 PROCEDURE — 87186 SC STD MICRODIL/AGAR DIL: CPT

## 2023-03-16 PROCEDURE — 87077 CULTURE AEROBIC IDENTIFY: CPT

## 2023-03-16 PROCEDURE — 81003 URINALYSIS AUTO W/O SCOPE: CPT

## 2023-03-16 PROCEDURE — 87086 URINE CULTURE/COLONY COUNT: CPT

## 2023-03-16 RX ORDER — PHENAZOPYRIDINE HYDROCHLORIDE 100 MG/1
100 TABLET, FILM COATED ORAL 3 TIMES DAILY PRN
Qty: 9 TABLET | Refills: 0 | Status: SHIPPED | OUTPATIENT
Start: 2023-03-16 | End: 2023-03-19

## 2023-03-16 RX ORDER — NITROFURANTOIN 25; 75 MG/1; MG/1
100 CAPSULE ORAL 2 TIMES DAILY
Qty: 10 CAPSULE | Refills: 0 | Status: SHIPPED | OUTPATIENT
Start: 2023-03-16 | End: 2023-03-21

## 2023-03-16 ASSESSMENT — ENCOUNTER SYMPTOMS
CONSTIPATION: 0
VOMITING: 0
COUGH: 0
DIARRHEA: 0
SHORTNESS OF BREATH: 0
NAUSEA: 0

## 2023-03-16 ASSESSMENT — PAIN - FUNCTIONAL ASSESSMENT: PAIN_FUNCTIONAL_ASSESSMENT: NONE - DENIES PAIN

## 2023-03-16 NOTE — ED PROVIDER NOTES
[facility]  Urgent Care Encounter       CHIEF COMPLAINT       Chief Complaint   Patient presents with    Urinary Frequency       Nurses Notes reviewed and I agree except as noted in the HPI. HISTORY OF PRESENT ILLNESS   Deidra Caba is a 81 y.o. female who presents plaints of pain with urination, increased frequency, discomfort, and urgency. Patient reports symptoms have been going on for 3 days. Patient reports trying cranberry juice and water at home with no relief. Patient denies CVA tenderness, vaginal discharge, vaginal bleeding, hematuria. HPI    REVIEW OF SYSTEMS     Review of Systems   Constitutional:  Negative for chills, fatigue and fever. Respiratory:  Negative for cough and shortness of breath. Cardiovascular:  Negative for chest pain. Gastrointestinal:  Negative for constipation, diarrhea, nausea and vomiting. Genitourinary:  Positive for difficulty urinating, dysuria, frequency and urgency. Negative for flank pain, hematuria, vaginal bleeding and vaginal discharge. All other systems reviewed and are negative. PAST MEDICAL HISTORY         Diagnosis Date    Arthritis     CAD (coronary artery disease)     Heart murmur     Hyperlipidemia     Sleep apnea     Thyroid disease     Hypothyroid    VSD (ventricular septal defect)        SURGICALHISTORY     Patient  has a past surgical history that includes doppler echocardiography (10-01-10); eye surgery (Bilateral); bladder suspension (); Knee cartilage surgery (Right); shoulder surgery (Right);  section; Tubal ligation (); Hysterectomy (); Colonoscopy; Dilatation, esophagus; other surgical history (2015); Ventriculoperitoneal shunt (Right, 10/11/2016); and sinus surgery. CURRENT MEDICATIONS       Previous Medications    CALCIUM PO    Take by mouth daily    CPAP MACHINE MISC    by Does not apply route Please change BIPAP pressure to 18/15 cm H20.     ESTRADIOL (ESTRACE) 0.1 MG/GM VAGINAL CREAM    Place 2 g vaginally daily    HYDROCHLOROTHIAZIDE (HYDRODIURIL) 12.5 MG TABLET    Take 1 tablet by mouth daily    HYDROCORTISONE 0.5 % CREAM    Apply topically 2 times daily Apply topically 2 times daily. KETOCONAZOLE (NIZORAL) 2 % CREAM    Apply topically daily Apply topically daily. LEVOTHYROXINE (SYNTHROID) 112 MCG TABLET    Take 112 mcg by mouth Daily. METHYLCELLULOSE, LAXATIVE, (CITRUCEL PO)    Take by mouth    MULTIPLE VITAMIN (MULTI VITAMIN PO)    Take by mouth daily    MULTIPLE VITAMINS-MINERALS (EYE VITAMINS PO)    Take by mouth    PROBIOTIC PRODUCT (ALIGN PO)    Take by mouth    SIMVASTATIN (ZOCOR) 10 MG TABLET    Take 10 mg by mouth nightly. ALLERGIES     Patient is is allergic to clindamycin/lincomycin, iv dye [iodides], sulfa antibiotics, and iodine. Patients   Immunization History   Administered Date(s) Administered    Tdap (Boostrix, Adacel) 09/24/2019, 09/07/2022       FAMILY HISTORY     Patient's family history includes Cancer in her sister; Heart Disease in her sister. SOCIAL HISTORY     Patient  reports that she has never smoked. She has never used smokeless tobacco. She reports that she does not drink alcohol and does not use drugs. PHYSICAL EXAM     ED TRIAGE VITALS  BP: 129/60, Temp: 97.4 °F (36.3 °C), Heart Rate: 87, Resp: 14, SpO2: 98 %,Estimated body mass index is 24.14 kg/m² as calculated from the following:    Height as of this encounter: 5' 2\" (1.575 m). Weight as of this encounter: 132 lb (59.9 kg). ,No LMP recorded. Patient has had a hysterectomy. Physical Exam  Constitutional:       Appearance: Normal appearance. HENT:      Head: Normocephalic. Cardiovascular:      Rate and Rhythm: Normal rate and regular rhythm. Heart sounds: Murmur heard. Pulmonary:      Effort: Pulmonary effort is normal. No respiratory distress. Breath sounds: Normal breath sounds. Abdominal:      General: Abdomen is flat. Tenderness:  There is no right CVA tenderness or left CVA tenderness. Skin:     General: Skin is warm and dry. Neurological:      Mental Status: She is alert. DIAGNOSTIC RESULTS     Labs:  Results for orders placed or performed during the hospital encounter of 03/16/23   Urinalysis   Result Value Ref Range    Glucose, Ur Negative NEGATIVE mg/dl    Bilirubin Urine Negative NEGATIVE    Ketones, Urine Negative NEGATIVE    Specific Gravity, Urine 1.015 1.002 - 1.030    Blood, Urine Negative NEGATIVE    pH, Urine 5.00 5.0 - 9.0    Protein, Urine Negative NEGATIVE mg/dl    Urobilinogen, Urine 0.20 0.2 - 1.0 eu/dl    Nitrite, Urine Negative NEGATIVE    Leukocyte Esterase, Urine Small (A) NEGATIVE    Color, UA Yellow STRAW-YELLOW    Character, Urine Clear CLEAR-SL CLOUD       IMAGING:    No orders to display         EKG:      URGENT CARE COURSE:     Vitals:    03/16/23 1034   BP: 129/60   Pulse: 87   Resp: 14   Temp: 97.4 °F (36.3 °C)   TempSrc: Tympanic   SpO2: 98%   Weight: 132 lb (59.9 kg)   Height: 5' 2\" (1.575 m)       Medications - No data to display         PROCEDURES:  None    FINAL IMPRESSION      1. UTI (urinary tract infection), uncomplicated          DISPOSITION/ PLAN   Diagnosed with UTI and started on Macrobid and Pyridium. Patient educated take medications as prescribed. Patient educated follow-up with PCP 3 to 5 days as needed. Patient educated return to emergency services for new or worsening symptoms. Patient educated to lots of fluids, as well as wipe from front to back. Denies any questions or concerns at this time. Patient educated that urine culture to be done, and patient will be contacted if antibiotic is not treating bacteria.         PATIENT REFERRED TO:  Gregorio Alonzo MD  [facility] [address] / [address] [address]      DISCHARGE MEDICATIONS:  New Prescriptions    NITROFURANTOIN, MACROCRYSTAL-MONOHYDRATE, (MACROBID) 100 MG CAPSULE    Take 1 capsule by mouth 2 times daily for 5 days    PHENAZOPYRIDINE (PYRIDIUM) 100 MG TABLET    Take 1 tablet by mouth 3 times daily as needed for Pain       Discontinued Medications    No medications on file       Current Discharge Medication List          WILBER Rg CNP    (Please note that portions of this note were completed with a voice recognition program. Efforts were made to edit the dictations but occasionally words are mis-transcribed.)           WILBER Rg CNP  03/16/23 7168

## 2023-03-18 LAB
BACTERIA UR CULT: ABNORMAL
ORGANISM: ABNORMAL

## 2023-03-23 ENCOUNTER — TELEPHONE (OUTPATIENT)
Dept: CARDIOLOGY CLINIC | Age: 88
End: 2023-03-23

## 2023-03-23 NOTE — TELEPHONE ENCOUNTER
Patient was last seen 11/28/2022 and her next appt is 11/27/2023 with Dr Nain Colunga. She is calling in to schedule another appt with Dr Nain Colunga, she declined cnp/pa. She said for the past month she has noticed increased shortness of breath and increased edema in her lower legs. Please call her to advise.

## 2023-03-27 ENCOUNTER — OFFICE VISIT (OUTPATIENT)
Dept: CARDIOLOGY CLINIC | Age: 88
End: 2023-03-27
Payer: MEDICARE

## 2023-03-27 VITALS
DIASTOLIC BLOOD PRESSURE: 64 MMHG | SYSTOLIC BLOOD PRESSURE: 130 MMHG | HEIGHT: 62 IN | BODY MASS INDEX: 25.03 KG/M2 | HEART RATE: 73 BPM | WEIGHT: 136 LBS

## 2023-03-27 DIAGNOSIS — Q25.42 VSD (VENTRICULAR SEPTAL DEFECT AND AORTIC ARCH HYPOPLASIA: ICD-10-CM

## 2023-03-27 DIAGNOSIS — R06.02 SOB (SHORTNESS OF BREATH): Primary | ICD-10-CM

## 2023-03-27 DIAGNOSIS — Q21.0 VSD (VENTRICULAR SEPTAL DEFECT AND AORTIC ARCH HYPOPLASIA: ICD-10-CM

## 2023-03-27 DIAGNOSIS — R60.9 EDEMA, UNSPECIFIED TYPE: ICD-10-CM

## 2023-03-27 PROCEDURE — 99214 OFFICE O/P EST MOD 30 MIN: CPT | Performed by: NUCLEAR MEDICINE

## 2023-03-27 PROCEDURE — 93000 ELECTROCARDIOGRAM COMPLETE: CPT | Performed by: NUCLEAR MEDICINE

## 2023-03-27 PROCEDURE — G8420 CALC BMI NORM PARAMETERS: HCPCS | Performed by: NUCLEAR MEDICINE

## 2023-03-27 PROCEDURE — G8484 FLU IMMUNIZE NO ADMIN: HCPCS | Performed by: NUCLEAR MEDICINE

## 2023-03-27 PROCEDURE — 1036F TOBACCO NON-USER: CPT | Performed by: NUCLEAR MEDICINE

## 2023-03-27 PROCEDURE — G8427 DOCREV CUR MEDS BY ELIG CLIN: HCPCS | Performed by: NUCLEAR MEDICINE

## 2023-03-27 PROCEDURE — 1090F PRES/ABSN URINE INCON ASSESS: CPT | Performed by: NUCLEAR MEDICINE

## 2023-03-27 PROCEDURE — 1123F ACP DISCUSS/DSCN MKR DOCD: CPT | Performed by: NUCLEAR MEDICINE

## 2023-03-27 RX ORDER — BUMETANIDE 0.5 MG/1
0.5 TABLET ORAL DAILY
COMMUNITY
End: 2023-03-27 | Stop reason: SDUPTHER

## 2023-03-27 RX ORDER — BUMETANIDE 0.5 MG/1
0.5 TABLET ORAL DAILY
Qty: 30 TABLET | Refills: 5 | Status: SHIPPED | OUTPATIENT
Start: 2023-03-27

## 2023-03-27 RX ORDER — POTASSIUM CHLORIDE 750 MG/1
10 TABLET, EXTENDED RELEASE ORAL 2 TIMES DAILY
COMMUNITY
End: 2023-03-27 | Stop reason: SDUPTHER

## 2023-03-27 RX ORDER — POTASSIUM CHLORIDE 750 MG/1
10 TABLET, EXTENDED RELEASE ORAL 2 TIMES DAILY
Qty: 60 TABLET | Refills: 5 | Status: SHIPPED | OUTPATIENT
Start: 2023-03-27

## 2023-03-27 NOTE — PROGRESS NOTES
[address]  [address]  [facility] [address]  Dept: [phone]  Dept Fax: [phone]  Loc: [phone]    Visit Date: 3/27/2023    Dottie Menjivar is a 81 y.o. female who presents todayfor:  Chief Complaint   Patient presents with    Check-Up    Ventricular Septal Defect   More edema   More dyspnea  Known VSD for ever  no chest pain   Some more dyspnea  Fluid overload  BP is stable  No dizziness  No syncope        HPI:  HPI  Past Medical History:   Diagnosis Date    Arthritis     CAD (coronary artery disease)     Heart murmur     Hyperlipidemia     Sleep apnea     Thyroid disease     Hypothyroid    VSD (ventricular septal defect)       Past Surgical History:   Procedure Laterality Date    BLADDER SUSPENSION       SECTION      x2    COLONOSCOPY      DILATATION, ESOPHAGUS      DOPPLER ECHOCARDIOGRAPHY  10-01-10    EF 45-50%    EYE SURGERY Bilateral     cataracts    HYSTERECTOMY (CERVIX STATUS UNKNOWN)      KNEE CARTILAGE SURGERY Right     OTHER SURGICAL HISTORY  2015    VAGINAL ANTERIOR AND POSTERIOR REPAIR , SOLYX BLADDER SLING, CYSTOSCOPY    SHOULDER SURGERY Right     SINUS SURGERY      TUBAL LIGATION      VENTRICULOPERITONEAL SHUNT Right 10/11/2016    Dr Yung Woodson     Family History   Problem Relation Age of Onset    Cancer Sister     Heart Disease Sister      Social History     Tobacco Use    Smoking status: Never    Smokeless tobacco: Never   Substance Use Topics    Alcohol use: No     Alcohol/week: 0.0 standard drinks      Current Outpatient Medications   Medication Sig Dispense Refill    estradiol (ESTRACE) 0.1 MG/GM vaginal cream Place 2 g vaginally daily      hydroCHLOROthiazide (HYDRODIURIL) 12.5 MG tablet Take 1 tablet by mouth daily 90 tablet 3    Methylcellulose, Laxative, (CITRUCEL PO) Take by mouth      Probiotic Product (ALIGN PO) Take by mouth      Multiple Vitamins-Minerals (EYE VITAMINS PO) Take by

## 2023-04-05 ENCOUNTER — HOSPITAL ENCOUNTER (OUTPATIENT)
Dept: NON INVASIVE DIAGNOSTICS | Age: 88
Discharge: HOME | End: 2023-04-05
Payer: MEDICARE

## 2023-04-05 DIAGNOSIS — Q25.42 VSD (VENTRICULAR SEPTAL DEFECT AND AORTIC ARCH HYPOPLASIA: ICD-10-CM

## 2023-04-05 DIAGNOSIS — Q21.0 VSD (VENTRICULAR SEPTAL DEFECT AND AORTIC ARCH HYPOPLASIA: ICD-10-CM

## 2023-04-05 DIAGNOSIS — R60.9 EDEMA, UNSPECIFIED TYPE: ICD-10-CM

## 2023-04-05 DIAGNOSIS — R06.02 SOB (SHORTNESS OF BREATH): ICD-10-CM

## 2023-04-05 LAB
LV EF: 55 %
LVEF MODALITY: NORMAL

## 2023-04-05 PROCEDURE — 93306 TTE W/DOPPLER COMPLETE: CPT

## 2023-04-17 ENCOUNTER — TELEPHONE (OUTPATIENT)
Dept: CARDIOLOGY CLINIC | Age: 88
End: 2023-04-17

## 2023-04-17 DIAGNOSIS — I50.30 HEART FAILURE WITH PRESERVED EJECTION FRACTION, NYHA CLASS II (HCC): ICD-10-CM

## 2023-04-17 DIAGNOSIS — R06.02 SOB (SHORTNESS OF BREATH): Primary | ICD-10-CM

## 2023-04-17 NOTE — TELEPHONE ENCOUNTER
----- Message from WILBER Branham CNP sent at 4/17/2023  2:55 PM EDT -----  Labs reviewed no changes at this time.

## 2023-04-17 NOTE — TELEPHONE ENCOUNTER
Patient notified and verbalized understanding. Checked on symptoms after increasing Bumex for 3-5 days. Says swelling is a little better, SOB about the same.  Still gets SOB with exertion

## 2023-04-19 NOTE — TELEPHONE ENCOUNTER
Patient called office   She needs a new Rx for increase dose of bumex to pharmcy  Pharmacy will not fill with new rx

## 2023-04-19 NOTE — TELEPHONE ENCOUNTER
Tried calling pt, she did not  and no voicemail. Stress test ordered. At this time I am not going to increase her bumex as she stated it did not help with her symptoms. If she states differently this time let me know.

## 2023-04-20 RX ORDER — BUMETANIDE 0.5 MG/1
1 TABLET ORAL DAILY
Qty: 180 TABLET | Refills: 3 | Status: SHIPPED | OUTPATIENT
Start: 2023-04-20

## 2023-04-20 NOTE — TELEPHONE ENCOUNTER
Spoke with patient   She has been taking 1mg of Bumex daily and fluid is starting to come off    She is ok with having stress test  Order given to scheduling

## 2023-05-03 ENCOUNTER — HOSPITAL ENCOUNTER (OUTPATIENT)
Dept: NON INVASIVE DIAGNOSTICS | Age: 88
Discharge: HOME | End: 2023-05-03
Payer: MEDICARE

## 2023-05-03 DIAGNOSIS — R06.02 SOB (SHORTNESS OF BREATH): ICD-10-CM

## 2023-05-03 PROCEDURE — 93017 CV STRESS TEST TRACING ONLY: CPT | Performed by: NUCLEAR MEDICINE

## 2023-05-03 PROCEDURE — 6360000002 HC RX W HCPCS

## 2023-05-03 PROCEDURE — 3430000000 HC RX DIAGNOSTIC RADIOPHARMACEUTICAL: Performed by: NURSE PRACTITIONER

## 2023-05-03 PROCEDURE — A9500 TC99M SESTAMIBI: HCPCS | Performed by: NURSE PRACTITIONER

## 2023-05-03 PROCEDURE — 78452 HT MUSCLE IMAGE SPECT MULT: CPT | Performed by: NUCLEAR MEDICINE

## 2023-05-03 RX ORDER — TETRAKIS(2-METHOXYISOBUTYLISOCYANIDE)COPPER(I) TETRAFLUOROBORATE 1 MG/ML
8.8 INJECTION, POWDER, LYOPHILIZED, FOR SOLUTION INTRAVENOUS
Status: COMPLETED | OUTPATIENT
Start: 2023-05-03 | End: 2023-05-03

## 2023-05-03 RX ORDER — TETRAKIS(2-METHOXYISOBUTYLISOCYANIDE)COPPER(I) TETRAFLUOROBORATE 1 MG/ML
29.9 INJECTION, POWDER, LYOPHILIZED, FOR SOLUTION INTRAVENOUS
Status: COMPLETED | OUTPATIENT
Start: 2023-05-03 | End: 2023-05-03

## 2023-05-03 RX ADMIN — Medication 29.9 MILLICURIE: at 12:40

## 2023-05-03 RX ADMIN — Medication 8.8 MILLICURIE: at 11:40

## 2023-05-04 ENCOUNTER — TELEPHONE (OUTPATIENT)
Dept: CARDIOLOGY CLINIC | Age: 88
End: 2023-05-04

## 2023-05-04 NOTE — PROGRESS NOTES
[facility]      Heidi Valencia         [id number]  11/15/2022   Chief Complaint   Patient presents with    Follow-up     7 month CSA, new machine         Pt of [place]     PAP Download:   Original or initial AHI: 50.3     Date of initial study: 11/21/2013      Compliant  100%     Noncompliant 0 %     PAP Type spont   Level  18/15 cmH2O    Avg Hrs/Day 6 hours 41 minutes   AHI: 4.0   Recorded compliance dates , 10/16/2022  to 11/14/2022   Machine/Mfg:   [x] ResMed    [] Respironics/Dreamstation   Interface:   [] Nasal    [] Nasal pillows   [x] FFM      Provider:      [x] [facility]     [][facility]     [] [employer]    [] Normal    [] [facility]               [] [facility]      [] [employer]    [] [address]      [] Other    Neck Size: 14  Mallampati Mallampati 3  ESS:  20  SAQLI: 72    Here is a scan of the most recent download:                Presentation:   Dean Cummings presents for sleep medicine follow up for obstructive sleep apnea  Since the last visit, Dean Cummings is doing well with new PAP. She is sleeping well. She falls asleep easily if bored. She feels better with a nap every afternoon. Equipment issues: The pressure is  acceptable, the mask is acceptable     Sleep issues:  Do you feel better? Yes  More rested? Yes   Better concentration? yes    Progress History:   Since last visit any new medical issues? No  New ER or hospital visits? No  Any new or changes in medicines? No  Any new sleep medicines? No    Review of Systems -   Review of Systems   Constitutional:  Negative for activity change, appetite change, chills and fever. HENT:  Negative for congestion and postnasal drip. Eyes: Negative. Respiratory:  Negative for cough, chest tightness, shortness of breath, wheezing and stridor. Cardiovascular:  Negative for chest pain and leg swelling. Gastrointestinal:  Negative for diarrhea and nausea. Endocrine: Negative. Genitourinary: Negative.     Musculoskeletal: Negative. Negative for arthralgias and back pain. Skin: Negative. Allergic/Immunologic: Negative. Neurological: Negative. Negative for dizziness and light-headedness. Psychiatric/Behavioral: Negative. All other systems reviewed and are negative. Physical Exam:    BMI:  Body mass index is 24.51 kg/m². Wt Readings from Last 3 Encounters:   11/15/22 134 lb (60.8 kg)   09/19/22 130 lb (59 kg)   09/07/22 130 lb (59 kg)     Weight stable / unchanged  Vitals: /76   Pulse 75   Temp 97.3 °F (36.3 °C)   Ht 5' 2\" (1.575 m)   Wt 134 lb (60.8 kg)   SpO2 96% Comment: r/a  BMI 24.51 kg/m²       Physical Exam  Constitutional:       Appearance: Normal appearance. She is normal weight. HENT:      Head: Normocephalic and atraumatic. Right Ear: External ear normal.      Left Ear: External ear normal.      Nose: Nose normal.   Eyes:      Extraocular Movements: Extraocular movements intact. Conjunctiva/sclera: Conjunctivae normal.      Pupils: Pupils are equal, round, and reactive to light. Pulmonary:      Effort: Pulmonary effort is normal.   Musculoskeletal:      Cervical back: Normal range of motion and neck supple. Neurological:      General: No focal deficit present. Mental Status: She is alert and oriented to person, place, and time. Psychiatric:         Attention and Perception: Attention and perception normal.         Mood and Affect: Mood and affect normal.         Speech: Speech normal.         Behavior: Behavior normal. Behavior is cooperative. Thought Content: Thought content normal.         Cognition and Memory: Cognition normal.         Judgment: Judgment normal.         ASSESSMENT/DIAGNOSIS     Diagnosis Orders   1. Complex sleep apnea syndrome        2. Hypersomnia                 Plan   Do you need any equipment today?  Yes update supplies  - Download reviewed and discussed with patient  - She  was advised to continue current positive airway pressure therapy with above described pressure. - She  advised to keep good compliance with current recommended pressure to get optimal results and clinical improvement  - Recommend 7-9 hours of sleep with PAP  - She was advised to call [employer] company regarding supplies if needed.   -She call my office for earlier appointment if needed for worsening of sleep symptoms.   - She was instructed on weight loss  - Sultana Ace was educated about my impression and plan. Patient verbalizesunderstanding.   We will see Carmen Poon back in: 1 year with download    Information added by my medical assistant/LPN was reviewed today       Derick Rodriguez, [facility] for pulmonary and Sleep Medicine  11/15/2022 with patient

## 2023-05-04 NOTE — TELEPHONE ENCOUNTER
----- Message from WILBER Mi CNP sent at 5/4/2023  7:27 AM EDT -----  Negative stress test. No further changes at this time

## 2023-08-07 ENCOUNTER — OFFICE VISIT (OUTPATIENT)
Dept: CARDIOLOGY CLINIC | Age: 88
End: 2023-08-07
Payer: MEDICARE

## 2023-08-07 VITALS
WEIGHT: 127 LBS | DIASTOLIC BLOOD PRESSURE: 68 MMHG | HEART RATE: 80 BPM | SYSTOLIC BLOOD PRESSURE: 114 MMHG | HEIGHT: 62 IN | BODY MASS INDEX: 23.37 KG/M2

## 2023-08-07 DIAGNOSIS — Q21.0 VSD (VENTRICULAR SEPTAL DEFECT): ICD-10-CM

## 2023-08-07 DIAGNOSIS — R06.09 DYSPNEA ON EXERTION: Primary | ICD-10-CM

## 2023-08-07 PROCEDURE — 1036F TOBACCO NON-USER: CPT | Performed by: NUCLEAR MEDICINE

## 2023-08-07 PROCEDURE — G8427 DOCREV CUR MEDS BY ELIG CLIN: HCPCS | Performed by: NUCLEAR MEDICINE

## 2023-08-07 PROCEDURE — 1123F ACP DISCUSS/DSCN MKR DOCD: CPT | Performed by: NUCLEAR MEDICINE

## 2023-08-07 PROCEDURE — G8420 CALC BMI NORM PARAMETERS: HCPCS | Performed by: NUCLEAR MEDICINE

## 2023-08-07 PROCEDURE — 1090F PRES/ABSN URINE INCON ASSESS: CPT | Performed by: NUCLEAR MEDICINE

## 2023-08-07 PROCEDURE — 99213 OFFICE O/P EST LOW 20 MIN: CPT | Performed by: NUCLEAR MEDICINE

## 2023-08-07 NOTE — PROGRESS NOTES
[address] [address]  Dept: [phone]  Dept Fax: [phone]  Loc: [phone]    Visit Date: 2023    Zeyad Woo is a 81 y.o. female who presents todayfor:  Chief Complaint   Patient presents with    Check-Up    Shortness of Breath   Known VSD   Lately started on bumex  Doing better  No chest pain  No changes in breathing  Some dyspnea at baseline         HPI:  HPI  Past Medical History:   Diagnosis Date    Arthritis     CAD (coronary artery disease)     Heart murmur     Hyperlipidemia     Sleep apnea     Thyroid disease     Hypothyroid    VSD (ventricular septal defect)       Past Surgical History:   Procedure Laterality Date    BLADDER SUSPENSION       SECTION      x2    COLONOSCOPY      DILATATION, ESOPHAGUS      DOPPLER ECHOCARDIOGRAPHY  10-01-10    EF 45-50%    EYE SURGERY Bilateral     cataracts    HYSTERECTOMY (CERVIX STATUS UNKNOWN)      KNEE CARTILAGE SURGERY Right     OTHER SURGICAL HISTORY  2015    VAGINAL ANTERIOR AND POSTERIOR REPAIR , SOLYX BLADDER SLING, CYSTOSCOPY    SHOULDER SURGERY Right     SINUS SURGERY      TUBAL LIGATION      VENTRICULOPERITONEAL SHUNT Right 10/11/2016    Dr Petros Matos     Family History   Problem Relation Age of Onset    Cancer Sister     Heart Disease Sister      Social History     Tobacco Use    Smoking status: Never    Smokeless tobacco: Never   Substance Use Topics    Alcohol use: No     Alcohol/week: 0.0 standard drinks      Current Outpatient Medications   Medication Sig Dispense Refill    bumetanide (BUMEX) 0.5 MG tablet Take 2 tablets by mouth daily 180 tablet 3    potassium chloride (KLOR-CON M) 10 MEQ extended release tablet Take 1 tablet by mouth 2 times daily 60 tablet 5    estradiol (ESTRACE) 0.1 MG/GM vaginal cream Place 2 g vaginally daily      Methylcellulose, Laxative, (CITRUCEL PO) Take by mouth      Probiotic Product (ALIGN

## 2023-10-05 RX ORDER — POTASSIUM CHLORIDE 750 MG/1
10 TABLET, EXTENDED RELEASE ORAL 2 TIMES DAILY
Qty: 60 TABLET | Refills: 0 | Status: SHIPPED | OUTPATIENT
Start: 2023-10-05

## 2023-10-05 NOTE — TELEPHONE ENCOUNTER
Surinder Mcmahan called requesting a refill on the following medications:  Requested Prescriptions     Pending Prescriptions Disp Refills    potassium chloride (KLOR-CON M) 10 MEQ extended release tablet 60 tablet 5     Sig: Take 1 tablet by mouth 2 times daily     Pharmacy verified: [facility] in [place]  .       Date of last visit: 8/07/2023  Date of next visit (if applicable): 5/52/9022

## 2023-10-18 ENCOUNTER — OFFICE VISIT (OUTPATIENT)
Dept: CARDIOLOGY CLINIC | Age: 88
End: 2023-10-18
Payer: MEDICARE

## 2023-10-18 VITALS
WEIGHT: 129.4 LBS | HEART RATE: 65 BPM | SYSTOLIC BLOOD PRESSURE: 120 MMHG | OXYGEN SATURATION: 99 % | DIASTOLIC BLOOD PRESSURE: 60 MMHG | BODY MASS INDEX: 23.67 KG/M2

## 2023-10-18 DIAGNOSIS — G47.33 OSA TREATED WITH BIPAP: ICD-10-CM

## 2023-10-18 DIAGNOSIS — I50.30 HEART FAILURE WITH PRESERVED EJECTION FRACTION, NYHA CLASS II (HCC): Primary | ICD-10-CM

## 2023-10-18 DIAGNOSIS — Z91.89 AT RISK FOR FLUID VOLUME OVERLOAD: ICD-10-CM

## 2023-10-18 DIAGNOSIS — Q21.0 VSD (VENTRICULAR SEPTAL DEFECT AND AORTIC ARCH HYPOPLASIA: ICD-10-CM

## 2023-10-18 DIAGNOSIS — Q25.42 VSD (VENTRICULAR SEPTAL DEFECT AND AORTIC ARCH HYPOPLASIA: ICD-10-CM

## 2023-10-18 PROCEDURE — G8427 DOCREV CUR MEDS BY ELIG CLIN: HCPCS | Performed by: NURSE PRACTITIONER

## 2023-10-18 PROCEDURE — G8420 CALC BMI NORM PARAMETERS: HCPCS | Performed by: NURSE PRACTITIONER

## 2023-10-18 PROCEDURE — 1036F TOBACCO NON-USER: CPT | Performed by: NURSE PRACTITIONER

## 2023-10-18 PROCEDURE — 1090F PRES/ABSN URINE INCON ASSESS: CPT | Performed by: NURSE PRACTITIONER

## 2023-10-18 PROCEDURE — 99214 OFFICE O/P EST MOD 30 MIN: CPT | Performed by: NURSE PRACTITIONER

## 2023-10-18 PROCEDURE — G8484 FLU IMMUNIZE NO ADMIN: HCPCS | Performed by: NURSE PRACTITIONER

## 2023-10-18 PROCEDURE — 1123F ACP DISCUSS/DSCN MKR DOCD: CPT | Performed by: NURSE PRACTITIONER

## 2023-10-18 RX ORDER — POTASSIUM CHLORIDE 750 MG/1
10 TABLET, EXTENDED RELEASE ORAL 2 TIMES DAILY
Qty: 180 TABLET | Refills: 3 | Status: SHIPPED | OUTPATIENT
Start: 2023-10-18

## 2023-10-18 ASSESSMENT — ENCOUNTER SYMPTOMS
SHORTNESS OF BREATH: 0
ABDOMINAL DISTENTION: 0
COUGH: 0
ABDOMINAL PAIN: 0
WHEEZING: 0

## 2023-10-18 NOTE — PATIENT INSTRUCTIONS
You may receive a survey regarding the care you received during your visit. Your input is valuable to us. We encourage you to complete and return your survey. We hope you will choose us in the future for your healthcare needs. Your nurses today were Winnie and TRAppoetve.   Office hours:   Mon-Thurs 8-4:30  Friday 8-12  Phone: [phone]    Continue:  Continue current medications  Daily weights and record  Fluid restriction of 2 Liters per day  Limit sodium in diet to around 1910-0566 mg/day  Monitor BP  Activity as tolerated     Call the [address] for any of the following symptoms:   Weight gain of 2-3 pounds in 1 day or 5 pounds in 1 week  Increased shortness of breath  Shortness of breath while laying down  Cough  Chest pain  Swelling in feet, ankles or legs  Bloating in abdomen  Fatigue

## 2023-10-18 NOTE — PROGRESS NOTES
Heart Failure Clinic       Visit Date: 10/18/2023  Cardiologist:  Dr. Cesar Dacosta  Primary Care Physician: Dr. Yancy Sullivan MD    Ankur Olivera is a 81 y.o. female who presents today for:  Chief Complaint   Patient presents with    6 Month Follow-Up    Congestive Heart Failure       HPI:     TYPE HF: HFpEF 55%    Cause:   Device:   HX: VSD, CAD, hypothyroidism, HLD  Dry Wt:  133 on 4/12/23, 129 on 10/18/23      Ankur Olivera is a 81 y.o. female who presents to the office for a f/u patient visit in the heart failure clinic. Concerns today: here today for her 6 month f/u. Weight is stable and not hospitalizations for CHF. Bumex was increased by me with improvement of her SOB and leg swelling. She did have a stress test done as well - negative. She notes good urination as well. She does complain of some bloating but cannot really relate it to fluid. Denies orthopnea. Good appetite - watching Na/fluid. Visit on 4/12/23: here today as a new pt referred by Dr. Cesar Dacosta for diuretic management. Pt was seen the end of March and noted to have more lower leg swelling and dyspnea. Pt had been on HCTZ and 0.5mg of Bumex was added. An ECHO was ordered showing an EF of 55% and normal.   Today she notes that over the last several months she has started to notice bilateral lower leg swelling, bloating and more SOB with steps or carrying items. Her appetite is good. Does not consume a high Na diet and drinks around 42oz a day.        Patient follows:      Hospitalization:        Activity: ADLs performed w/ some KHAN  Diet: Follows - educated     Patient has:  Chest Pain: no  SOB: yes - resolved   Orthopnea/PND: no (sleep upright for her GERD)    MISAEL: yes on a BIPAP nightly   Edema: yes -resolved   Fatigue: no more than normal  Abdominal bloating: yes  Cough: no  Appetite: good      Past Medical History:   Diagnosis Date    Arthritis     CAD (coronary artery disease)     Heart murmur     Hyperlipidemia     Sleep apnea

## 2023-11-13 ENCOUNTER — OFFICE VISIT (OUTPATIENT)
Dept: PULMONOLOGY | Age: 88
End: 2023-11-13
Payer: MEDICARE

## 2023-11-13 VITALS
WEIGHT: 130.6 LBS | SYSTOLIC BLOOD PRESSURE: 128 MMHG | OXYGEN SATURATION: 99 % | HEART RATE: 70 BPM | HEIGHT: 62 IN | DIASTOLIC BLOOD PRESSURE: 68 MMHG | TEMPERATURE: 97.5 F | BODY MASS INDEX: 24.03 KG/M2

## 2023-11-13 DIAGNOSIS — G47.31 COMPLEX SLEEP APNEA SYNDROME: Primary | ICD-10-CM

## 2023-11-13 DIAGNOSIS — G47.10 HYPERSOMNIA: ICD-10-CM

## 2023-11-13 PROCEDURE — 1090F PRES/ABSN URINE INCON ASSESS: CPT | Performed by: PHYSICIAN ASSISTANT

## 2023-11-13 PROCEDURE — 1123F ACP DISCUSS/DSCN MKR DOCD: CPT | Performed by: PHYSICIAN ASSISTANT

## 2023-11-13 PROCEDURE — G8427 DOCREV CUR MEDS BY ELIG CLIN: HCPCS | Performed by: PHYSICIAN ASSISTANT

## 2023-11-13 PROCEDURE — 1036F TOBACCO NON-USER: CPT | Performed by: PHYSICIAN ASSISTANT

## 2023-11-13 PROCEDURE — G8484 FLU IMMUNIZE NO ADMIN: HCPCS | Performed by: PHYSICIAN ASSISTANT

## 2023-11-13 PROCEDURE — 99214 OFFICE O/P EST MOD 30 MIN: CPT | Performed by: PHYSICIAN ASSISTANT

## 2023-11-13 PROCEDURE — G8420 CALC BMI NORM PARAMETERS: HCPCS | Performed by: PHYSICIAN ASSISTANT

## 2023-11-13 ASSESSMENT — ENCOUNTER SYMPTOMS
BACK PAIN: 0
DIARRHEA: 0
ALLERGIC/IMMUNOLOGIC NEGATIVE: 1
COUGH: 0
SHORTNESS OF BREATH: 0
NAUSEA: 0
WHEEZING: 0
CHEST TIGHTNESS: 0
EYES NEGATIVE: 1
STRIDOR: 0

## 2023-12-04 ENCOUNTER — TELEPHONE (OUTPATIENT)
Dept: PULMONOLOGY | Age: 88
End: 2023-12-04

## 2024-01-25 ENCOUNTER — OFFICE VISIT (OUTPATIENT)
Dept: CARDIOLOGY CLINIC | Age: 89
End: 2024-01-25
Payer: MEDICARE

## 2024-01-25 VITALS
BODY MASS INDEX: 23.77 KG/M2 | DIASTOLIC BLOOD PRESSURE: 78 MMHG | RESPIRATION RATE: 18 BRPM | HEIGHT: 62 IN | OXYGEN SATURATION: 97 % | SYSTOLIC BLOOD PRESSURE: 128 MMHG | HEART RATE: 82 BPM | WEIGHT: 129.2 LBS

## 2024-01-25 DIAGNOSIS — Q25.42 VSD (VENTRICULAR SEPTAL DEFECT AND AORTIC ARCH HYPOPLASIA: ICD-10-CM

## 2024-01-25 DIAGNOSIS — Q21.0 VSD (VENTRICULAR SEPTAL DEFECT AND AORTIC ARCH HYPOPLASIA: ICD-10-CM

## 2024-01-25 DIAGNOSIS — G47.33 OSA TREATED WITH BIPAP: ICD-10-CM

## 2024-01-25 DIAGNOSIS — Z91.89 AT RISK FOR FLUID VOLUME OVERLOAD: ICD-10-CM

## 2024-01-25 DIAGNOSIS — I50.30 HEART FAILURE WITH PRESERVED EJECTION FRACTION, NYHA CLASS II (HCC): Primary | ICD-10-CM

## 2024-01-25 PROCEDURE — G8427 DOCREV CUR MEDS BY ELIG CLIN: HCPCS | Performed by: NURSE PRACTITIONER

## 2024-01-25 PROCEDURE — 99214 OFFICE O/P EST MOD 30 MIN: CPT | Performed by: NURSE PRACTITIONER

## 2024-01-25 PROCEDURE — 1090F PRES/ABSN URINE INCON ASSESS: CPT | Performed by: NURSE PRACTITIONER

## 2024-01-25 PROCEDURE — G8484 FLU IMMUNIZE NO ADMIN: HCPCS | Performed by: NURSE PRACTITIONER

## 2024-01-25 PROCEDURE — 1036F TOBACCO NON-USER: CPT | Performed by: NURSE PRACTITIONER

## 2024-01-25 PROCEDURE — G8420 CALC BMI NORM PARAMETERS: HCPCS | Performed by: NURSE PRACTITIONER

## 2024-01-25 PROCEDURE — 1123F ACP DISCUSS/DSCN MKR DOCD: CPT | Performed by: NURSE PRACTITIONER

## 2024-01-25 ASSESSMENT — ENCOUNTER SYMPTOMS
ABDOMINAL DISTENTION: 0
WHEEZING: 0
COUGH: 0
SHORTNESS OF BREATH: 0
ABDOMINAL PAIN: 0

## 2024-01-25 NOTE — PROGRESS NOTES
Other - Kcl 10 BID, synthroid       HFpEF 55%   VSD - Lt to Rt  MISAEL on BiPAP    Stable, resolved lower leg swelling on exam. Urinating well. Discussed stopping her Citrucel and her probiotic to see if this helps with the bloating.     Lab reviewed - K 4.2 Cr 0.97 mag 2.3 hgb 12.0   ECHO 2023: normal - no valvular concerns or atrial dilation   CATH 2014: patent coronary arteries, LVEDP 12    No medication changes today    Continue diet/fluid adherence  Continue daily wts.  F/U w/ Cardiology  F/U in clinic in 6 months      Tolerating above noted HF meds, no ill side effects noted. Will continue to monitor kidney function and electrolytes. Will optimize as tolerated.   Pt is compliant w/ medications.    Total visit time of 30 minutes has been spent with patient on education of symptoms, management, medication, and plan of care; as well as review of chart: labs, ECHO, radiology reports, etc.   I personally spent more then 50% of the appt time face to face with the patient.  Daily weights  Fluid restriction of 2 Liters per day  Limit sodium in diet to around 9483-6914 mg/day  Monitor BP  Activity as tolerated     Patient was instructed to call the Heart Failure Clinic for any changes in symptoms as noted in AVS.      Return in about 6 months (around 7/25/2024). or sooner if needed     Patient given educational materials - see patient instructions.   We discussed the importance of weighing oneself and recording daily. We also discussed the importance of a low sodium diet, higher sodium foods to avoid and better low sodium food options.   Patient verbalizes understanding of plan of care using teach back method, and is agreeable to the treatment plan.       Electronically signed by WILBER Steele CNP on 1/25/2024 at 11:43 AM

## 2024-01-25 NOTE — PATIENT INSTRUCTIONS
You may receive a survey regarding the care you received during your visit.  Your input is valuable to us.  We encourage you to complete and return your survey.  We hope you will choose us in the future for your healthcare needs.    Your nurses today were Brielle.  Office hours:   Mon-Thurs 8-4:30  Friday 8-12  Phone: [phone]    Continue:  Continue current medications  Daily weights and record  Fluid restriction of 2 Liters per day  Limit sodium in diet to around 5911-2951 mg/day  Monitor BP  Activity as tolerated     Call the Heart Failure Clinic for any of the following symptoms:   Weight gain of 2-3 pounds in 1 day or 5 pounds in 1 week  Increased shortness of breath  Shortness of breath while laying down  Cough  Chest pain  Swelling in feet, ankles or legs  Bloating in abdomen  Fatigue

## 2024-03-06 LAB
ANION GAP SERPL CALCULATED.3IONS-SCNC: 6 MEQ/L (ref 7–16)
BUN BLDV-MCNC: 26 MG/DL (ref 8–23)
CALCIUM SERPL-MCNC: 9.2 MG/DL (ref 8.5–10.5)
CHLORIDE BLD-SCNC: 102 MEQ/L (ref 95–107)
CO2: 31 MEQ/L (ref 19–31)
CREAT SERPL-MCNC: 0.91 MG/DL (ref 0.6–1.3)
EGFR IF NONAFRICAN AMERICAN: 61 ML/MIN/1.73
GLUCOSE: 70 MG/DL (ref 70–99)
POTASSIUM SERPL-SCNC: 4.3 MEQ/L (ref 3.5–5.4)
SODIUM BLD-SCNC: 139 MEQ/L (ref 133–146)
TSH SERPL DL<=0.05 MIU/L-ACNC: 0.63 UIU/ML (ref 0.4–4.1)

## 2024-04-10 ENCOUNTER — OFFICE VISIT (OUTPATIENT)
Dept: FAMILY MEDICINE CLINIC | Age: 89
End: 2024-04-10
Payer: MEDICARE

## 2024-04-10 VITALS
HEART RATE: 62 BPM | WEIGHT: 130.4 LBS | BODY MASS INDEX: 23.85 KG/M2 | SYSTOLIC BLOOD PRESSURE: 130 MMHG | RESPIRATION RATE: 16 BRPM | TEMPERATURE: 98.1 F | DIASTOLIC BLOOD PRESSURE: 68 MMHG | OXYGEN SATURATION: 99 %

## 2024-04-10 DIAGNOSIS — M19.90 ARTHRITIS: ICD-10-CM

## 2024-04-10 DIAGNOSIS — E03.9 ACQUIRED HYPOTHYROIDISM: ICD-10-CM

## 2024-04-10 DIAGNOSIS — E78.00 PURE HYPERCHOLESTEROLEMIA: ICD-10-CM

## 2024-04-10 DIAGNOSIS — I10 ESSENTIAL HYPERTENSION: Primary | ICD-10-CM

## 2024-04-10 DIAGNOSIS — G47.31 COMPLEX SLEEP APNEA SYNDROME: ICD-10-CM

## 2024-04-10 DIAGNOSIS — K21.9 GASTROESOPHAGEAL REFLUX DISEASE, UNSPECIFIED WHETHER ESOPHAGITIS PRESENT: ICD-10-CM

## 2024-04-10 DIAGNOSIS — R01.1 HEART MURMUR: ICD-10-CM

## 2024-04-10 DIAGNOSIS — G91.2 NPH (NORMAL PRESSURE HYDROCEPHALUS) (HCC): Chronic | ICD-10-CM

## 2024-04-10 DIAGNOSIS — Z78.0 POST-MENOPAUSAL: ICD-10-CM

## 2024-04-10 DIAGNOSIS — Q21.0 VSD (VENTRICULAR SEPTAL DEFECT): ICD-10-CM

## 2024-04-10 PROCEDURE — 99205 OFFICE O/P NEW HI 60 MIN: CPT | Performed by: NURSE PRACTITIONER

## 2024-04-10 PROCEDURE — G8420 CALC BMI NORM PARAMETERS: HCPCS | Performed by: NURSE PRACTITIONER

## 2024-04-10 PROCEDURE — G8427 DOCREV CUR MEDS BY ELIG CLIN: HCPCS | Performed by: NURSE PRACTITIONER

## 2024-04-10 PROCEDURE — 1123F ACP DISCUSS/DSCN MKR DOCD: CPT | Performed by: NURSE PRACTITIONER

## 2024-04-10 PROCEDURE — 1036F TOBACCO NON-USER: CPT | Performed by: NURSE PRACTITIONER

## 2024-04-10 PROCEDURE — 1090F PRES/ABSN URINE INCON ASSESS: CPT | Performed by: NURSE PRACTITIONER

## 2024-04-10 RX ORDER — SIMVASTATIN 10 MG
10 TABLET ORAL NIGHTLY
Qty: 90 TABLET | Refills: 1 | Status: SHIPPED | OUTPATIENT
Start: 2024-04-10

## 2024-04-10 RX ORDER — LEVOTHYROXINE SODIUM 112 UG/1
112 TABLET ORAL DAILY
Qty: 90 TABLET | Refills: 1 | Status: SHIPPED | OUTPATIENT
Start: 2024-04-10

## 2024-04-10 SDOH — ECONOMIC STABILITY: FOOD INSECURITY: WITHIN THE PAST 12 MONTHS, THE FOOD YOU BOUGHT JUST DIDN'T LAST AND YOU DIDN'T HAVE MONEY TO GET MORE.: NEVER TRUE

## 2024-04-10 SDOH — ECONOMIC STABILITY: FOOD INSECURITY: WITHIN THE PAST 12 MONTHS, YOU WORRIED THAT YOUR FOOD WOULD RUN OUT BEFORE YOU GOT MONEY TO BUY MORE.: NEVER TRUE

## 2024-04-10 SDOH — ECONOMIC STABILITY: HOUSING INSECURITY
IN THE LAST 12 MONTHS, WAS THERE A TIME WHEN YOU DID NOT HAVE A STEADY PLACE TO SLEEP OR SLEPT IN A SHELTER (INCLUDING NOW)?: NO

## 2024-04-10 SDOH — ECONOMIC STABILITY: INCOME INSECURITY: HOW HARD IS IT FOR YOU TO PAY FOR THE VERY BASICS LIKE FOOD, HOUSING, MEDICAL CARE, AND HEATING?: NOT HARD AT ALL

## 2024-04-10 ASSESSMENT — PATIENT HEALTH QUESTIONNAIRE - PHQ9
1. LITTLE INTEREST OR PLEASURE IN DOING THINGS: NOT AT ALL
SUM OF ALL RESPONSES TO PHQ QUESTIONS 1-9: 0
SUM OF ALL RESPONSES TO PHQ9 QUESTIONS 1 & 2: 0
SUM OF ALL RESPONSES TO PHQ QUESTIONS 1-9: 0
SUM OF ALL RESPONSES TO PHQ QUESTIONS 1-9: 0
2. FEELING DOWN, DEPRESSED OR HOPELESS: NOT AT ALL
SUM OF ALL RESPONSES TO PHQ QUESTIONS 1-9: 0

## 2024-04-10 ASSESSMENT — ENCOUNTER SYMPTOMS
CONSTIPATION: 0
CHEST TIGHTNESS: 0
SHORTNESS OF BREATH: 0
VOMITING: 0
COUGH: 0
EYE DISCHARGE: 0
RHINORRHEA: 0
ABDOMINAL PAIN: 0
DIARRHEA: 0

## 2024-04-10 NOTE — PROGRESS NOTES
.   [facility]  [address] [place] [id number]  Dept: [phone]  Dept Fax: [phone]    Visit type: New patient    Reason for Visit: New Patient (Establish Care) and Health Maintenance (AWV/Vaccines)         Assessment and Plan       1. Essential hypertension  -     CBC with Auto Differential; Future  -     Lipid Panel; Future  -     Comprehensive Metabolic Panel, Fasting; Future  -     Magnesium; Future  -     TSH with Reflex; Future  2. Complex sleep apnea syndrome  3. Gastroesophageal reflux disease, unspecified whether esophagitis present  4. VSD (ventricular septal defect)  5. Acquired hypothyroidism  -     levothyroxine (SYNTHROID) 112 MCG tablet; Take 1 tablet by mouth Daily, Disp-90 tablet, R-1Normal  -     TSH with Reflex; Future  6. Post-menopausal  7. Pure hypercholesterolemia  -     simvastatin (ZOCOR) 10 MG tablet; Take 1 tablet by mouth nightly, Disp-90 tablet, R-1Normal  -     CBC with Auto Differential; Future  -     Lipid Panel; Future  -     Comprehensive Metabolic Panel, Fasting; Future  -     Magnesium; Future  -     TSH with Reflex; Future  8. Arthritis  9. NPH (normal pressure hydrocephalus) (HCC)  10. Heart murmur    Is in process of getting shingles completed  Declines pnuemonia and RSV vaccine  Is routinely following with cardiology including congestive heart failure clinic  History of hyperlipidemia statin refilled  Is getting her HRT through her OB/GYN  Refill placed of her thyroid supplement, does voice that she had recent labs that were within the normal range  Will plan on getting repeat labs done in 6 months  With history of arthritis and coronary artery disease advised to continue staying active and follow Mediterranean diet  Continue wearing CPAP machine    Return in about 6 months (around 10/10/2024) for AWV_ get labs 1 week prior .       Subjective       Patient is here to establish care previous provider moved.  Is originally from [place] and has a

## 2024-04-15 ENCOUNTER — OFFICE VISIT (OUTPATIENT)
Dept: CARDIOLOGY CLINIC | Age: 89
End: 2024-04-15
Payer: MEDICARE

## 2024-04-15 VITALS
HEART RATE: 73 BPM | HEIGHT: 62 IN | DIASTOLIC BLOOD PRESSURE: 72 MMHG | SYSTOLIC BLOOD PRESSURE: 118 MMHG | BODY MASS INDEX: 24.37 KG/M2 | WEIGHT: 132.4 LBS

## 2024-04-15 DIAGNOSIS — Q25.42 VSD (VENTRICULAR SEPTAL DEFECT AND AORTIC ARCH HYPOPLASIA: ICD-10-CM

## 2024-04-15 DIAGNOSIS — R06.02 SOB (SHORTNESS OF BREATH): Primary | ICD-10-CM

## 2024-04-15 DIAGNOSIS — Q21.0 VSD (VENTRICULAR SEPTAL DEFECT AND AORTIC ARCH HYPOPLASIA: ICD-10-CM

## 2024-04-15 PROCEDURE — G8420 CALC BMI NORM PARAMETERS: HCPCS | Performed by: NUCLEAR MEDICINE

## 2024-04-15 PROCEDURE — 1123F ACP DISCUSS/DSCN MKR DOCD: CPT | Performed by: NUCLEAR MEDICINE

## 2024-04-15 PROCEDURE — 1036F TOBACCO NON-USER: CPT | Performed by: NUCLEAR MEDICINE

## 2024-04-15 PROCEDURE — G8428 CUR MEDS NOT DOCUMENT: HCPCS | Performed by: NUCLEAR MEDICINE

## 2024-04-15 PROCEDURE — 99213 OFFICE O/P EST LOW 20 MIN: CPT | Performed by: NUCLEAR MEDICINE

## 2024-04-15 PROCEDURE — 1090F PRES/ABSN URINE INCON ASSESS: CPT | Performed by: NUCLEAR MEDICINE

## 2024-04-15 PROCEDURE — 93000 ELECTROCARDIOGRAM COMPLETE: CPT | Performed by: NUCLEAR MEDICINE

## 2024-04-15 NOTE — PROGRESS NOTES
pressure to 17/14cm H20.  Please send download in 2 weeks 1 each 0    potassium chloride (KLOR-CON M) 10 MEQ extended release tablet Take 1 tablet by mouth 2 times daily 180 tablet 3    bumetanide (BUMEX) 0.5 MG tablet Take 2 tablets by mouth daily 180 tablet 3    estradiol (ESTRACE) 0.1 MG/GM vaginal cream Place 2 g vaginally daily      Methylcellulose, Laxative, (CITRUCEL PO) Take by mouth      Probiotic Product (ALIGN PO) Take by mouth      Multiple Vitamin (MULTI VITAMIN PO) Take by mouth daily      CALCIUM PO Take by mouth daily       No current facility-administered medications for this visit.     Allergies   Allergen Reactions    Clindamycin/Lincomycin Hives    Iv Dye [Iodides]     Sulfa Antibiotics Hives    Iodine Rash     blisters     Health Maintenance   Topic Date Due    COVID-19 Vaccine (5 - 2023-24 season) 09/01/2023    Annual Wellness Visit (Medicare)  Never done    Shingles vaccine (2 of 2) 03/28/2024    Pneumococcal 65+ years Vaccine (1 of 2 - PCV) 04/10/2025 (Originally 6/6/1941)    Respiratory Syncytial Virus (RSV) Pregnant or age 60 yrs+ (1 - 1-dose 60+ series) 04/10/2025 (Originally 6/6/1996)    Flu vaccine (Season Ended) 08/01/2024    Lipids  10/04/2024    Depression Screen  04/10/2025    DTaP/Tdap/Td vaccine (3 - Td or Tdap) 09/07/2032    Hepatitis A vaccine  Aged Out    Hepatitis B vaccine  Aged Out    Hib vaccine  Aged Out    Polio vaccine  Aged Out    Meningococcal (ACWY) vaccine  Aged Out       Subjective:  General:   No fever, no chills, some fatigue or weight loss  Pulmonary:    some dyspnea, no wheezing  Cardiac:    Denies recent chest pain,   GI:     No nausea or vomiting, no abdominal pain  Neuro:    No dizziness or light headedness,   Musculoskeletal:  No recent active issues  Extremities:   No edema, no obvious claudication       Objective:  General:   Well developed, well nourished  Lungs:   Clear to auscultation  Heart:    Normal S1 S2, Slight murmur. no rubs, no gallops  Abdomen:

## 2024-05-23 RX ORDER — BUMETANIDE 0.5 MG/1
1 TABLET ORAL DAILY
Qty: 180 TABLET | Refills: 3 | Status: SHIPPED | OUTPATIENT
Start: 2024-05-23

## 2024-07-29 ENCOUNTER — OFFICE VISIT (OUTPATIENT)
Dept: CARDIOLOGY CLINIC | Age: 89
End: 2024-07-29
Payer: MEDICARE

## 2024-07-29 VITALS
BODY MASS INDEX: 23.19 KG/M2 | OXYGEN SATURATION: 99 % | HEIGHT: 62 IN | HEART RATE: 74 BPM | WEIGHT: 126 LBS | SYSTOLIC BLOOD PRESSURE: 120 MMHG | DIASTOLIC BLOOD PRESSURE: 70 MMHG

## 2024-07-29 DIAGNOSIS — I50.30 HEART FAILURE WITH PRESERVED EJECTION FRACTION, NYHA CLASS II (HCC): Primary | ICD-10-CM

## 2024-07-29 DIAGNOSIS — Q21.0 VSD (VENTRICULAR SEPTAL DEFECT): ICD-10-CM

## 2024-07-29 DIAGNOSIS — Z91.89 AT RISK FOR FLUID VOLUME OVERLOAD: ICD-10-CM

## 2024-07-29 DIAGNOSIS — G47.33 OSA TREATED WITH BIPAP: ICD-10-CM

## 2024-07-29 PROCEDURE — 1090F PRES/ABSN URINE INCON ASSESS: CPT | Performed by: NURSE PRACTITIONER

## 2024-07-29 PROCEDURE — G8420 CALC BMI NORM PARAMETERS: HCPCS | Performed by: NURSE PRACTITIONER

## 2024-07-29 PROCEDURE — 99214 OFFICE O/P EST MOD 30 MIN: CPT | Performed by: NURSE PRACTITIONER

## 2024-07-29 PROCEDURE — 1123F ACP DISCUSS/DSCN MKR DOCD: CPT | Performed by: NURSE PRACTITIONER

## 2024-07-29 PROCEDURE — G8427 DOCREV CUR MEDS BY ELIG CLIN: HCPCS | Performed by: NURSE PRACTITIONER

## 2024-07-29 PROCEDURE — 1036F TOBACCO NON-USER: CPT | Performed by: NURSE PRACTITIONER

## 2024-07-29 ASSESSMENT — ENCOUNTER SYMPTOMS
SHORTNESS OF BREATH: 0
COUGH: 0
ABDOMINAL PAIN: 0
ABDOMINAL DISTENTION: 0
WHEEZING: 0

## 2024-07-29 NOTE — PROGRESS NOTES
lower leg: No edema.      Left lower leg: No edema.   Skin:     General: Skin is warm and dry.      Capillary Refill: Capillary refill takes less than 2 seconds.   Neurological:      Mental Status: She is alert and oriented to person, place, and time.      Coordination: Coordination normal.   Psychiatric:         Behavior: Behavior normal.         Wt Readings from Last 3 Encounters:   07/29/24 57.2 kg (126 lb)   04/15/24 60.1 kg (132 lb 6.4 oz)   04/10/24 59.1 kg (130 lb 6.4 oz)     BP Readings from Last 3 Encounters:   07/29/24 120/70   04/15/24 118/72   04/10/24 130/68     Pulse Readings from Last 3 Encounters:   07/29/24 74   04/15/24 73   04/10/24 62     Body mass index is 23.05 kg/m².    ECHO:    Conclusions      Summary   Ejection fraction is visually estimated at 55%.   Overall left ventricular function is normal.      Signature      ----------------------------------------------------------------   Electronically signed by Yulia Peters MD (Interpreting   physician) on 04/05/2023 at 07:44 PM   ----------------------------------------------------------------    CATH/STRESS:   HEMODYNAMIC RESULTS AND LEFT VENTRICULOGRAM:  Left ventricular end diastolic  pressure was 12 mmHg with no significant change before and after contrast  injection.  There was no significant gradient across the aortic valve.  The  VSD was visualized, seems to be relatively smaller size, probably small to  moderate size.     CORONARY ARTERIOGRAM RESULTS:       1.   The left main is patent, gives rise to the left anterior descending             coronary artery.       2.   Left anterior descending coronary artery is patent.       3.   Left circumflex artery is patent.       4.   Right coronary artery is patent.     CONCLUSION:       1.   Patent coronary arteries.       2.   No significant coronary artery disease formation.       3.   Small to moderate VSD.       4.   No complication.     RECOMMENDATION:  At this point, medical treatment

## 2024-07-29 NOTE — PATIENT INSTRUCTIONS
You may receive a survey regarding the care you received during your visit.  Your input is valuable to us.  We encourage you to complete and return your survey.  We hope you will choose us in the future for your healthcare needs.    Your nurses today were Brielle.  Office hours:   Mon-Thurs 8-4:30  Friday 8-12  Phone: [phone]    Continue:  Continue current medications  Daily weights and record  Fluid restriction of 2 Liters per day  Limit sodium in diet to around 3488-3869 mg/day  Monitor BP  Activity as tolerated     Call the Heart Failure Clinic for any of the following symptoms:   Weight gain of -3 pounds in 1 day or 5 pounds in 1 week  Increased shortness of breath  Shortness of breath while laying down  Cough  Chest pain  Swelling in feet, ankles or legs  Bloating in abdomen  Fatigue        No medication changes today    Blood work prior to your PCP's office visit    Continue diet/fluid adherence  Continue daily wts.  F/U w/ Cardiology  F/U in clinic in 6 months

## 2024-10-02 ENCOUNTER — LAB (OUTPATIENT)
Dept: FAMILY MEDICINE CLINIC | Age: 89
End: 2024-10-02
Payer: MEDICARE

## 2024-10-02 DIAGNOSIS — E78.00 PURE HYPERCHOLESTEROLEMIA: ICD-10-CM

## 2024-10-02 DIAGNOSIS — E03.9 ACQUIRED HYPOTHYROIDISM: ICD-10-CM

## 2024-10-02 DIAGNOSIS — I10 ESSENTIAL HYPERTENSION, MALIGNANT: Primary | ICD-10-CM

## 2024-10-02 DIAGNOSIS — I10 ESSENTIAL HYPERTENSION: ICD-10-CM

## 2024-10-02 LAB
ALBUMIN SERPL BCG-MCNC: 4.2 G/DL (ref 3.5–5.1)
ALP SERPL-CCNC: 68 U/L (ref 38–126)
ALT SERPL W/O P-5'-P-CCNC: 16 U/L (ref 11–66)
ANION GAP SERPL CALC-SCNC: 9 MEQ/L (ref 8–16)
AST SERPL-CCNC: 22 U/L (ref 5–40)
BASOPHILS ABSOLUTE: 0 THOU/MM3 (ref 0–0.1)
BASOPHILS NFR BLD AUTO: 0.7 %
BILIRUB SERPL-MCNC: 0.5 MG/DL (ref 0.3–1.2)
BUN SERPL-MCNC: 33 MG/DL (ref 7–22)
CALCIUM SERPL-MCNC: 9.2 MG/DL (ref 8.5–10.5)
CHLORIDE SERPL-SCNC: 102 MEQ/L (ref 98–111)
CHOLEST SERPL-MCNC: 169 MG/DL (ref 100–199)
CO2 SERPL-SCNC: 27 MEQ/L (ref 23–33)
CREAT SERPL-MCNC: 0.9 MG/DL (ref 0.4–1.2)
DEPRECATED RDW RBC AUTO: 42 FL (ref 35–45)
EOSINOPHIL NFR BLD AUTO: 6.7 %
EOSINOPHILS ABSOLUTE: 0.4 THOU/MM3 (ref 0–0.4)
ERYTHROCYTE [DISTWIDTH] IN BLOOD BY AUTOMATED COUNT: 12.5 % (ref 11.5–14.5)
GFR SERPL CREATININE-BSD FRML MDRD: 61 ML/MIN/1.73M2
GLUCOSE FASTING: 90 MG/DL (ref 70–108)
HCT VFR BLD AUTO: 37.7 % (ref 37–47)
HDLC SERPL-MCNC: 61 MG/DL
HGB BLD-MCNC: 12.7 GM/DL (ref 12–16)
IMM GRANULOCYTES # BLD AUTO: 0.02 THOU/MM3 (ref 0–0.07)
IMM GRANULOCYTES NFR BLD AUTO: 0.3 %
LDLC SERPL CALC-MCNC: 91 MG/DL
LYMPHOCYTES ABSOLUTE: 2 THOU/MM3 (ref 1–4.8)
LYMPHOCYTES NFR BLD AUTO: 33.1 %
MAGNESIUM SERPL-MCNC: 2.3 MG/DL (ref 1.6–2.4)
MCH RBC QN AUTO: 31.1 PG (ref 26–33)
MCHC RBC AUTO-ENTMCNC: 33.7 GM/DL (ref 32.2–35.5)
MCV RBC AUTO: 92.2 FL (ref 81–99)
MONOCYTES ABSOLUTE: 0.5 THOU/MM3 (ref 0.4–1.3)
MONOCYTES NFR BLD AUTO: 7.9 %
NEUTROPHILS ABSOLUTE: 3.1 THOU/MM3 (ref 1.8–7.7)
NEUTROPHILS NFR BLD AUTO: 51.3 %
NRBC BLD AUTO-RTO: 0 /100 WBC
PLATELET # BLD AUTO: 203 THOU/MM3 (ref 130–400)
PMV BLD AUTO: 10.7 FL (ref 9.4–12.4)
POTASSIUM SERPL-SCNC: 4.7 MEQ/L (ref 3.5–5.2)
PROT SERPL-MCNC: 6.9 G/DL (ref 6.1–8)
RBC # BLD AUTO: 4.09 MILL/MM3 (ref 4.2–5.4)
SODIUM SERPL-SCNC: 138 MEQ/L (ref 135–145)
TRIGL SERPL-MCNC: 86 MG/DL (ref 0–199)
TSH SERPL DL<=0.005 MIU/L-ACNC: 1.07 UIU/ML (ref 0.4–4.2)
WBC # BLD AUTO: 6.1 THOU/MM3 (ref 4.8–10.8)

## 2024-10-02 PROCEDURE — 36415 COLL VENOUS BLD VENIPUNCTURE: CPT | Performed by: NURSE PRACTITIONER

## 2024-10-07 RX ORDER — POTASSIUM CHLORIDE 750 MG/1
10 TABLET, EXTENDED RELEASE ORAL 2 TIMES DAILY
Qty: 180 TABLET | Refills: 4 | Status: SHIPPED | OUTPATIENT
Start: 2024-10-07

## 2024-10-09 ENCOUNTER — OFFICE VISIT (OUTPATIENT)
Dept: FAMILY MEDICINE CLINIC | Age: 89
End: 2024-10-09
Payer: MEDICARE

## 2024-10-09 VITALS
WEIGHT: 128 LBS | SYSTOLIC BLOOD PRESSURE: 112 MMHG | BODY MASS INDEX: 23.41 KG/M2 | TEMPERATURE: 97.3 F | DIASTOLIC BLOOD PRESSURE: 58 MMHG | OXYGEN SATURATION: 96 % | HEART RATE: 78 BPM | RESPIRATION RATE: 16 BRPM

## 2024-10-09 DIAGNOSIS — G47.39 COMPLEX SLEEP APNEA SYNDROME: ICD-10-CM

## 2024-10-09 DIAGNOSIS — M72.2 PLANTAR FASCIITIS OF RIGHT FOOT: Primary | ICD-10-CM

## 2024-10-09 DIAGNOSIS — I10 ESSENTIAL HYPERTENSION: ICD-10-CM

## 2024-10-09 DIAGNOSIS — E03.9 ACQUIRED HYPOTHYROIDISM: ICD-10-CM

## 2024-10-09 DIAGNOSIS — K21.9 GASTROESOPHAGEAL REFLUX DISEASE, UNSPECIFIED WHETHER ESOPHAGITIS PRESENT: ICD-10-CM

## 2024-10-09 DIAGNOSIS — R01.1 HEART MURMUR: ICD-10-CM

## 2024-10-09 DIAGNOSIS — E78.00 PURE HYPERCHOLESTEROLEMIA: ICD-10-CM

## 2024-10-09 PROCEDURE — G8427 DOCREV CUR MEDS BY ELIG CLIN: HCPCS | Performed by: NURSE PRACTITIONER

## 2024-10-09 PROCEDURE — 1090F PRES/ABSN URINE INCON ASSESS: CPT | Performed by: NURSE PRACTITIONER

## 2024-10-09 PROCEDURE — G8420 CALC BMI NORM PARAMETERS: HCPCS | Performed by: NURSE PRACTITIONER

## 2024-10-09 PROCEDURE — 1036F TOBACCO NON-USER: CPT | Performed by: NURSE PRACTITIONER

## 2024-10-09 PROCEDURE — 1123F ACP DISCUSS/DSCN MKR DOCD: CPT | Performed by: NURSE PRACTITIONER

## 2024-10-09 PROCEDURE — 99214 OFFICE O/P EST MOD 30 MIN: CPT | Performed by: NURSE PRACTITIONER

## 2024-10-09 PROCEDURE — G8484 FLU IMMUNIZE NO ADMIN: HCPCS | Performed by: NURSE PRACTITIONER

## 2024-10-09 RX ORDER — METHYLPREDNISOLONE 4 MG
TABLET, DOSE PACK ORAL
Qty: 1 KIT | Refills: 0 | Status: SHIPPED | OUTPATIENT
Start: 2024-10-09 | End: 2024-10-15

## 2024-10-09 ASSESSMENT — ENCOUNTER SYMPTOMS
ABDOMINAL PAIN: 0
SHORTNESS OF BREATH: 0
VOMITING: 0
RHINORRHEA: 0
CHEST TIGHTNESS: 0
EYE DISCHARGE: 0
CONSTIPATION: 0
DIARRHEA: 0
COUGH: 0

## 2024-10-09 ASSESSMENT — PATIENT HEALTH QUESTIONNAIRE - PHQ9: DEPRESSION UNABLE TO ASSESS: PT REFUSES

## 2024-10-09 NOTE — PROGRESS NOTES
Health Maintenance Due   Topic Date Due    Shingles vaccine (2 of 2) 03/28/2024    Annual Wellness Visit (Medicare)  07/12/2024    Flu vaccine (1) Never done    COVID-19 Vaccine (5 - 2023-24 season) 09/01/2024

## 2024-10-09 NOTE — ASSESSMENT & PLAN NOTE
Orders:    TSH With Reflex Ft4; Future    CBC with Auto Differential; Future    Comprehensive Metabolic Panel; Future

## 2024-10-09 NOTE — PROGRESS NOTES
.   [facility] [place] [facility]  [address]  [place] [address]  Dept: [phone]  Dept Fax: [phone]    Visit type: Established patient    Reason for Visit: Health Maintenance (See note /) and Foot Pain (Sore heel on the left - x couple weeks but getting worse. Worse when she first gets up in the morning. The more she walks on it as well the worse it gets /)      Assessment & Plan   Assessment and Plan       Assessment & Plan  Plantar fasciitis of right foot   Steroid today, stretches, water bottle frozen    Orders:    methylPREDNISolone (MEDROL DOSEPACK) 4 MG tablet; Take by mouth.    Essential hypertension       Orders:    TSH With Reflex Ft4; Future    CBC with Auto Differential; Future    Comprehensive Metabolic Panel; Future    Complex sleep apnea syndrome            Acquired hypothyroidism       Orders:    TSH With Reflex Ft4; Future    Gastroesophageal reflux disease, unspecified whether esophagitis present            Pure hypercholesterolemia            Heart murmur       Continue fu with specialty  Last labs reviewed            Return in about 6 months (around 4/9/2025) for chronic issues- get labs prior .       Subjective       Here for follow up.     Declines AWV and flu vaccine     History of congestive heart failure and hypertension.    Is taking Bumex and potassium supplementation.    Is following routinely with cardiology through [facility] along with congestive heart failure clinic.    States that she does have a murmur.    Denies any shortness of breath or chest pain    History of GERD.    Rarely needs to take Tums.    She does at times sleep with 2 pillows and believes this helps with her symptoms.    Postmenopausal.    Onset over a year ago.    Is following with OB/GYN specialist in [place].    Was having urinary issues prior to starting her Estrace therapy.    Is getting this prescription through her specialist      Arthritis  Onset over a year ago  Multiple joints affected  Is

## 2024-11-26 ENCOUNTER — TELEPHONE (OUTPATIENT)
Dept: PULMONOLOGY | Age: 89
End: 2024-11-26

## 2024-11-26 DIAGNOSIS — G47.39 COMPLEX SLEEP APNEA SYNDROME: Primary | ICD-10-CM

## 2024-11-26 NOTE — TELEPHONE ENCOUNTER
Patient called in to reschedule her follow up with Sheila since she is not longer at our practice. I rescheduled her with Destin for 01/22/2025 as this was the sooner that I could get her in our office. She states that she is needing supplies. I spoke with PENNY Hooks and she stated that we can write a supply order for her as long as she has a scheduled follow up in our office. I returned to my call with the patient and told her that I would send our providers a message asking them to write an order for PAP supplies and then I will fax the order to [facility]. Patient verbalized understanding.  -  Please advise regarding PAP supply order, thanks!

## 2025-01-02 DIAGNOSIS — E78.00 PURE HYPERCHOLESTEROLEMIA: ICD-10-CM

## 2025-01-02 RX ORDER — SIMVASTATIN 10 MG
10 TABLET ORAL NIGHTLY
Qty: 90 TABLET | Refills: 1 | Status: SHIPPED | OUTPATIENT
Start: 2025-01-02

## 2025-01-16 DIAGNOSIS — E03.9 ACQUIRED HYPOTHYROIDISM: ICD-10-CM

## 2025-01-16 RX ORDER — LEVOTHYROXINE SODIUM 112 UG/1
112 TABLET ORAL DAILY
Qty: 90 TABLET | Refills: 1 | Status: SHIPPED | OUTPATIENT
Start: 2025-01-16

## 2025-01-21 NOTE — PROGRESS NOTES
[facility]      Елена Cannon         [id number]  1/22/2025   Chief Complaint   Patient presents with    Follow-up     Complex sleep apnea last seen 11/2023 with [facility]me download (prior shabana patient)        Patient of Shabana Estrada PA-C     Assessment/Plan   1. Complex sleep apnea syndrome  - DME Order for CPAP as OP  2.  Hypersomnia      -Yearly supply order placed? Yes   -Download was personally reviewed and discussed with patient at length.  -The symptoms of MISAEL have improved. The patient is benefiting from therapy and should continue use of PAP device.  -Patient's symptoms and AHI are mostly controlled with current settings of 18/15 cmH2O. Continue current pressure settings.  -Advised to continue current positive airway pressure therapy with above described pressure.   -Advised to keep good compliance with current recommended pressure to get optimal results and clinical improvement  -Recommend 7-9 hours of sleep with PAP  -Instructed to call DME company regarding supplies if needed.   -Patient to call my office for earlier appointment if needed for worsening of sleep symptoms.   -Patient is not to drive if feeling sleepy    Educated about my impression and plan. Patient verbalizes understanding.      Return in about 1 year (around 1/22/2026) for complex sleep apnea. with download.      Subjective   Presentation:   Елена presents for sleep medicine follow up for complex sleep apnea syndrome.  Since the last visit, Елена has been doing very well on PAP therapy and reports good benefit from compliant use of PAP machine. No complaints of problems with machine, mask, or pressures at this time.     Chronic daytime sleepiness.     Sleep issues:  Do you feel better? Yes  More rested?Yes   Better concentration? yes  Difficulty falling sleep?  No  Difficulty staying asleep?  No  Snoring?  No    Progress History:   Since last visit any new medical issues? No  Any new or

## 2025-01-22 ENCOUNTER — OFFICE VISIT (OUTPATIENT)
Dept: PULMONOLOGY | Age: 89
End: 2025-01-22
Payer: MEDICARE

## 2025-01-22 VITALS
HEART RATE: 63 BPM | BODY MASS INDEX: 24.55 KG/M2 | DIASTOLIC BLOOD PRESSURE: 60 MMHG | OXYGEN SATURATION: 99 % | WEIGHT: 133.4 LBS | HEIGHT: 62 IN | SYSTOLIC BLOOD PRESSURE: 122 MMHG

## 2025-01-22 DIAGNOSIS — G47.39 COMPLEX SLEEP APNEA SYNDROME: Primary | ICD-10-CM

## 2025-01-22 DIAGNOSIS — G47.10 HYPERSOMNIA: ICD-10-CM

## 2025-01-22 PROCEDURE — 99214 OFFICE O/P EST MOD 30 MIN: CPT

## 2025-01-22 PROCEDURE — 1159F MED LIST DOCD IN RCRD: CPT

## 2025-01-22 PROCEDURE — 1123F ACP DISCUSS/DSCN MKR DOCD: CPT

## 2025-01-22 PROCEDURE — 1036F TOBACCO NON-USER: CPT

## 2025-01-22 PROCEDURE — G8427 DOCREV CUR MEDS BY ELIG CLIN: HCPCS

## 2025-01-22 PROCEDURE — G8420 CALC BMI NORM PARAMETERS: HCPCS

## 2025-01-22 PROCEDURE — 1090F PRES/ABSN URINE INCON ASSESS: CPT

## 2025-01-22 RX ORDER — NAPROXEN 250 MG/1
500 TABLET ORAL 2 TIMES DAILY WITH MEALS
COMMUNITY

## 2025-01-22 ASSESSMENT — ENCOUNTER SYMPTOMS
SORE THROAT: 0
COUGH: 0
SINUS PRESSURE: 0
WHEEZING: 0
RHINORRHEA: 0
SINUS PAIN: 0
SHORTNESS OF BREATH: 0

## 2025-01-24 ENCOUNTER — OFFICE VISIT (OUTPATIENT)
Dept: CARDIOLOGY CLINIC | Age: 89
End: 2025-01-24
Payer: MEDICARE

## 2025-01-24 VITALS
DIASTOLIC BLOOD PRESSURE: 60 MMHG | SYSTOLIC BLOOD PRESSURE: 128 MMHG | WEIGHT: 132 LBS | BODY MASS INDEX: 24.29 KG/M2 | HEIGHT: 62 IN | HEART RATE: 83 BPM | OXYGEN SATURATION: 99 %

## 2025-01-24 DIAGNOSIS — I25.10 CORONARY ARTERY DISEASE INVOLVING NATIVE CORONARY ARTERY OF NATIVE HEART WITHOUT ANGINA PECTORIS: ICD-10-CM

## 2025-01-24 DIAGNOSIS — I50.30 HEART FAILURE WITH PRESERVED EJECTION FRACTION, NYHA CLASS II (HCC): Primary | ICD-10-CM

## 2025-01-24 DIAGNOSIS — G47.33 OSA TREATED WITH BIPAP: ICD-10-CM

## 2025-01-24 DIAGNOSIS — Q21.0 VSD (VENTRICULAR SEPTAL DEFECT): ICD-10-CM

## 2025-01-24 PROCEDURE — 1123F ACP DISCUSS/DSCN MKR DOCD: CPT | Performed by: NURSE PRACTITIONER

## 2025-01-24 PROCEDURE — 1159F MED LIST DOCD IN RCRD: CPT | Performed by: NURSE PRACTITIONER

## 2025-01-24 PROCEDURE — G8427 DOCREV CUR MEDS BY ELIG CLIN: HCPCS | Performed by: NURSE PRACTITIONER

## 2025-01-24 PROCEDURE — G8420 CALC BMI NORM PARAMETERS: HCPCS | Performed by: NURSE PRACTITIONER

## 2025-01-24 PROCEDURE — 1090F PRES/ABSN URINE INCON ASSESS: CPT | Performed by: NURSE PRACTITIONER

## 2025-01-24 PROCEDURE — 99214 OFFICE O/P EST MOD 30 MIN: CPT | Performed by: NURSE PRACTITIONER

## 2025-01-24 PROCEDURE — 1036F TOBACCO NON-USER: CPT | Performed by: NURSE PRACTITIONER

## 2025-01-24 NOTE — PROGRESS NOTES
5/3/2023  [facility] Results:Calculated gated LVEF 67 %.  This study was negative for ischemia.  normal EF      Conclusions    Summary   This Nuclear Medicine study was negative for ischemia .   normal EF      Recommendation   Medical management.      Signatures    ----------------------------------------------------------------   Electronically signed by Yulia Peters     Cath:  HEMODYNAMIC RESULTS AND LEFT VENTRICULOGRAM:  Left ventricular end diastolic  pressure was 12 mmHg with no significant change before and after contrast  injection.  There was no significant gradient across the aortic valve.  The  VSD was visualized, seems to be relatively smaller size, probably small to  moderate size.     CORONARY ARTERIOGRAM RESULTS:       1.   The left main is patent, gives rise to the left anterior descending             coronary artery.       2.   Left anterior descending coronary artery is patent.       3.   Left circumflex artery is patent.       4.   Right coronary artery is patent.     CONCLUSION:       1.   Patent coronary arteries.       2.   No significant coronary artery disease formation.       3.   Small to moderate VSD.       4.   No complication.     RECOMMENDATION:  At this point, medical treatment and risk factor  modification is recommended.      Yulia Peters M.D.   D: 12/03/2014 09:36                                    T: 12/03/2014 13:50  ks      Results reviewed:  BNP: No results found for: \"BNP\"  CBC:   Lab Results   Component Value Date/Time    WBC 6.1 10/02/2024 09:05 AM    RBC 4.09 10/02/2024 09:05 AM    RBC 3.86 10/04/2023 08:30 AM    RBC 0-2 10/04/2023 08:30 AM    HGB 12.7 10/02/2024 09:05 AM    HGB 11.1 11/28/2011 07:30 PM    HCT 37.7 10/02/2024 09:05 AM     10/02/2024 09:05 AM     CMP:    Lab Results   Component Value Date/Time     10/02/2024 09:05 AM    K 4.7 10/02/2024 09:05 AM     10/02/2024 09:05 AM    CO2 27 10/02/2024 09:05 AM    BUN 33 10/02/2024 09:05 AM

## 2025-01-24 NOTE — PATIENT INSTRUCTIONS
If weight goes up more than 3 pounds overnight or more than 5 pounds in a week then take an extra 1/2 tablet of the Bumex that day.     If needing to use an extra 1/2 tablet more regularly or if it does not seem to help then call.     Continue other current medications as prescribed.    Stay as active as you can.     Eat heart healthy diet.     You may receive a survey regarding the care you received during your visit.  Your input is valuable to us.  We encourage you to complete and return your survey.  We hope you will choose us in the future for your healthcare needs.    Office hours: CHF clinic  Mon-Thurs 8-4:30  Friday 8-12  Phone: [phone]    Continue:  Continue current medications  Daily weights and record  Fluid restriction of 2 Liters per day  Limit sodium in diet to around 8978-2322 mg/day  Monitor BP    Call the Heart Failure Clinic for any of the following symptoms:   Weight gain of 3 pounds in 1 day or 5 pounds in 1 week and hte extra Bumex did not help  Increased shortness of breath  Shortness of breath while laying down  Chest pain  Swelling in feet, ankles or legs  Bloating in abdomen  Fatigue     Follow-up with your PCP as scheduled.    Follow-up with Dr. Ramos on 4/28/2024 as scheduled or sooner if need.     Follow-up in CHF clinic in 6 months or sooner if need.

## 2025-01-27 ASSESSMENT — ENCOUNTER SYMPTOMS: SHORTNESS OF BREATH: 1

## 2025-03-19 ENCOUNTER — OFFICE VISIT (OUTPATIENT)
Dept: FAMILY MEDICINE CLINIC | Age: 89
End: 2025-03-19
Payer: MEDICARE

## 2025-03-19 ENCOUNTER — RESULTS FOLLOW-UP (OUTPATIENT)
Dept: FAMILY MEDICINE CLINIC | Age: 89
End: 2025-03-19

## 2025-03-19 VITALS
OXYGEN SATURATION: 97 % | HEIGHT: 62 IN | HEART RATE: 70 BPM | BODY MASS INDEX: 24.4 KG/M2 | DIASTOLIC BLOOD PRESSURE: 60 MMHG | SYSTOLIC BLOOD PRESSURE: 120 MMHG | TEMPERATURE: 98 F | RESPIRATION RATE: 16 BRPM | WEIGHT: 132.6 LBS

## 2025-03-19 DIAGNOSIS — S49.91XA INJURY OF RIGHT SHOULDER, INITIAL ENCOUNTER: Primary | ICD-10-CM

## 2025-03-19 DIAGNOSIS — S46.011A ROTATOR CUFF STRAIN, RIGHT, INITIAL ENCOUNTER: ICD-10-CM

## 2025-03-19 PROCEDURE — G8420 CALC BMI NORM PARAMETERS: HCPCS | Performed by: NURSE PRACTITIONER

## 2025-03-19 PROCEDURE — 1159F MED LIST DOCD IN RCRD: CPT | Performed by: NURSE PRACTITIONER

## 2025-03-19 PROCEDURE — 1160F RVW MEDS BY RX/DR IN RCRD: CPT | Performed by: NURSE PRACTITIONER

## 2025-03-19 PROCEDURE — 99213 OFFICE O/P EST LOW 20 MIN: CPT | Performed by: NURSE PRACTITIONER

## 2025-03-19 PROCEDURE — G8427 DOCREV CUR MEDS BY ELIG CLIN: HCPCS | Performed by: NURSE PRACTITIONER

## 2025-03-19 PROCEDURE — 1090F PRES/ABSN URINE INCON ASSESS: CPT | Performed by: NURSE PRACTITIONER

## 2025-03-19 PROCEDURE — 1036F TOBACCO NON-USER: CPT | Performed by: NURSE PRACTITIONER

## 2025-03-19 PROCEDURE — 1123F ACP DISCUSS/DSCN MKR DOCD: CPT | Performed by: NURSE PRACTITIONER

## 2025-03-19 SDOH — ECONOMIC STABILITY: FOOD INSECURITY: WITHIN THE PAST 12 MONTHS, YOU WORRIED THAT YOUR FOOD WOULD RUN OUT BEFORE YOU GOT MONEY TO BUY MORE.: NEVER TRUE

## 2025-03-19 SDOH — ECONOMIC STABILITY: FOOD INSECURITY: WITHIN THE PAST 12 MONTHS, THE FOOD YOU BOUGHT JUST DIDN'T LAST AND YOU DIDN'T HAVE MONEY TO GET MORE.: NEVER TRUE

## 2025-03-19 ASSESSMENT — PATIENT HEALTH QUESTIONNAIRE - PHQ9
2. FEELING DOWN, DEPRESSED OR HOPELESS: NOT AT ALL
SUM OF ALL RESPONSES TO PHQ QUESTIONS 1-9: 0
SUM OF ALL RESPONSES TO PHQ QUESTIONS 1-9: 0
1. LITTLE INTEREST OR PLEASURE IN DOING THINGS: NOT AT ALL
SUM OF ALL RESPONSES TO PHQ QUESTIONS 1-9: 0
SUM OF ALL RESPONSES TO PHQ QUESTIONS 1-9: 0

## 2025-03-19 ASSESSMENT — ENCOUNTER SYMPTOMS
SHORTNESS OF BREATH: 0
WHEEZING: 0
COUGH: 0

## 2025-03-19 NOTE — PROGRESS NOTES
Health Maintenance Due   Topic Date Due    Shingles vaccine (2 of 2) 03/28/2024    Annual Wellness Visit (Medicare)  07/12/2024    COVID-19 Vaccine (5 - 2024-25 season) 09/01/2024    Depression Screen  04/10/2025       
applying ice to the affected area and declines the use of muscle relaxers for sleep.    Supplemental Information  She has a hearing problem and can not understand people over the phone.    MEDICATIONS  Bartolo Culver    Current Outpatient Medications on File Prior to Visit   Medication Sig Dispense Refill   • naproxen (NAPROSYN) 250 MG tablet Take 2 tablets by mouth 2 times daily (with meals)     • levothyroxine (SYNTHROID) 112 MCG tablet TAKE 1 TABLET BY MOUTH EVERY DAY 90 tablet 1   • simvastatin (ZOCOR) 10 MG tablet TAKE 1 TABLET BY MOUTH EVERY DAY AT NIGHT 90 tablet 1   • KLOR-CON M10 10 MEQ extended release tablet TAKE 1 TABLET BY MOUTH TWICE A  tablet 4   • bumetanide (BUMEX) 0.5 MG tablet TAKE 2 TABLETS BY MOUTH DAILY 180 tablet 3   • Misc. Devices (CPAP MACHINE) MISC by Does not apply route Please change BIPAP pressure to 17/14cm H20.  Please send download in 2 weeks 1 each 0   • estradiol (ESTRACE) 0.1 MG/GM vaginal cream Place 2 g vaginally daily     • Methylcellulose, Laxative, (CITRUCEL PO) Take by mouth     • Probiotic Product (ALIGN PO) Take by mouth     • Multiple Vitamin (MULTI VITAMIN PO) Take by mouth daily     • CALCIUM PO Take by mouth daily       No current facility-administered medications on file prior to visit.       Current Outpatient Medications on File Prior to Visit   Medication Sig Dispense Refill   • naproxen (NAPROSYN) 250 MG tablet Take 2 tablets by mouth 2 times daily (with meals)     • levothyroxine (SYNTHROID) 112 MCG tablet TAKE 1 TABLET BY MOUTH EVERY DAY 90 tablet 1   • simvastatin (ZOCOR) 10 MG tablet TAKE 1 TABLET BY MOUTH EVERY DAY AT NIGHT 90 tablet 1   • KLOR-CON M10 10 MEQ extended release tablet TAKE 1 TABLET BY MOUTH TWICE A  tablet 4   • bumetanide (BUMEX) 0.5 MG tablet TAKE 2 TABLETS BY MOUTH DAILY 180 tablet 3   • Misc. Devices (CPAP MACHINE) MISC by Does not apply route Please change BIPAP pressure to 17/14cm H20.  Please send download in 2 weeks 1

## 2025-04-02 ENCOUNTER — LAB (OUTPATIENT)
Dept: FAMILY MEDICINE CLINIC | Age: 89
End: 2025-04-02
Payer: MEDICARE

## 2025-04-02 DIAGNOSIS — E03.9 ACQUIRED HYPOTHYROIDISM: ICD-10-CM

## 2025-04-02 DIAGNOSIS — I10 ESSENTIAL HYPERTENSION: ICD-10-CM

## 2025-04-02 DIAGNOSIS — I10 ESSENTIAL HYPERTENSION: Primary | ICD-10-CM

## 2025-04-02 LAB
ALBUMIN SERPL BCG-MCNC: 4.1 G/DL (ref 3.4–4.9)
ALP SERPL-CCNC: 64 U/L (ref 35–104)
ALT SERPL W/O P-5'-P-CCNC: 19 U/L (ref 10–35)
ANION GAP SERPL CALC-SCNC: 11 MEQ/L (ref 8–16)
AST SERPL-CCNC: 28 U/L (ref 10–35)
BASOPHILS ABSOLUTE: 0.1 THOU/MM3 (ref 0–0.1)
BASOPHILS NFR BLD AUTO: 1.1 %
BILIRUB SERPL-MCNC: 0.4 MG/DL (ref 0.3–1.2)
BUN SERPL-MCNC: 30 MG/DL (ref 8–23)
CALCIUM SERPL-MCNC: 9.4 MG/DL (ref 8.8–10.2)
CHLORIDE SERPL-SCNC: 106 MEQ/L (ref 98–111)
CO2 SERPL-SCNC: 25 MEQ/L (ref 22–29)
CREAT SERPL-MCNC: 0.9 MG/DL (ref 0.5–0.9)
DEPRECATED RDW RBC AUTO: 45.1 FL (ref 35–45)
EOSINOPHIL NFR BLD AUTO: 5.2 %
EOSINOPHILS ABSOLUTE: 0.3 THOU/MM3 (ref 0–0.4)
ERYTHROCYTE [DISTWIDTH] IN BLOOD BY AUTOMATED COUNT: 13.4 % (ref 11.5–14.5)
GFR SERPL CREATININE-BSD FRML MDRD: 61 ML/MIN/1.73M2
GLUCOSE SERPL-MCNC: 92 MG/DL (ref 74–109)
HCT VFR BLD AUTO: 35.8 % (ref 37–47)
HGB BLD-MCNC: 12 GM/DL (ref 12–16)
IMM GRANULOCYTES # BLD AUTO: 0.01 THOU/MM3 (ref 0–0.07)
IMM GRANULOCYTES NFR BLD AUTO: 0.2 %
LYMPHOCYTES ABSOLUTE: 1.8 THOU/MM3 (ref 1–4.8)
LYMPHOCYTES NFR BLD AUTO: 32 %
MCH RBC QN AUTO: 31 PG (ref 26–33)
MCHC RBC AUTO-ENTMCNC: 33.5 GM/DL (ref 32.2–35.5)
MCV RBC AUTO: 92.5 FL (ref 81–99)
MONOCYTES ABSOLUTE: 0.5 THOU/MM3 (ref 0.4–1.3)
MONOCYTES NFR BLD AUTO: 8.6 %
NEUTROPHILS ABSOLUTE: 3 THOU/MM3 (ref 1.8–7.7)
NEUTROPHILS NFR BLD AUTO: 52.9 %
NRBC BLD AUTO-RTO: 0 /100 WBC
PLATELET # BLD AUTO: 222 THOU/MM3 (ref 130–400)
PMV BLD AUTO: 10.2 FL (ref 9.4–12.4)
POTASSIUM SERPL-SCNC: 5 MEQ/L (ref 3.5–5.2)
PROT SERPL-MCNC: 6.6 G/DL (ref 6.4–8.3)
RBC # BLD AUTO: 3.87 MILL/MM3 (ref 4.2–5.4)
SODIUM SERPL-SCNC: 142 MEQ/L (ref 135–145)
TSH SERPL DL<=0.05 MIU/L-ACNC: 1.02 UIU/ML (ref 0.27–4.2)
WBC # BLD AUTO: 5.6 THOU/MM3 (ref 4.8–10.8)

## 2025-04-02 PROCEDURE — 36415 COLL VENOUS BLD VENIPUNCTURE: CPT | Performed by: NURSE PRACTITIONER

## 2025-04-03 ENCOUNTER — RESULTS FOLLOW-UP (OUTPATIENT)
Dept: FAMILY MEDICINE CLINIC | Age: 89
End: 2025-04-03

## 2025-04-09 ENCOUNTER — OFFICE VISIT (OUTPATIENT)
Dept: FAMILY MEDICINE CLINIC | Age: 89
End: 2025-04-09

## 2025-04-09 VITALS
HEIGHT: 62 IN | DIASTOLIC BLOOD PRESSURE: 60 MMHG | OXYGEN SATURATION: 99 % | BODY MASS INDEX: 24.51 KG/M2 | SYSTOLIC BLOOD PRESSURE: 110 MMHG | WEIGHT: 133.2 LBS | RESPIRATION RATE: 12 BRPM | HEART RATE: 72 BPM | TEMPERATURE: 97.6 F

## 2025-04-09 DIAGNOSIS — Q21.0 VSD (VENTRICULAR SEPTAL DEFECT): ICD-10-CM

## 2025-04-09 DIAGNOSIS — K21.9 GASTROESOPHAGEAL REFLUX DISEASE, UNSPECIFIED WHETHER ESOPHAGITIS PRESENT: ICD-10-CM

## 2025-04-09 DIAGNOSIS — N18.2 CHRONIC RENAL DISEASE, STAGE 2, MILDLY DECREASED GLOMERULAR FILTRATION RATE BETWEEN 60-89 ML/MIN/1.73 SQUARE METER: Primary | ICD-10-CM

## 2025-04-09 DIAGNOSIS — G91.2 NPH (NORMAL PRESSURE HYDROCEPHALUS) (HCC): ICD-10-CM

## 2025-04-09 DIAGNOSIS — E03.9 ACQUIRED HYPOTHYROIDISM: ICD-10-CM

## 2025-04-09 DIAGNOSIS — R01.1 HEART MURMUR: ICD-10-CM

## 2025-04-09 DIAGNOSIS — I10 ESSENTIAL HYPERTENSION: ICD-10-CM

## 2025-04-09 ASSESSMENT — ENCOUNTER SYMPTOMS
COUGH: 0
CHEST TIGHTNESS: 0
CONSTIPATION: 0
DIARRHEA: 0
SHORTNESS OF BREATH: 0
VOMITING: 0
ABDOMINAL PAIN: 0

## 2025-04-09 NOTE — PROGRESS NOTES
Health Maintenance Due   Topic Date Due    Shingles vaccine (2 of 2) 03/28/2024    Annual Wellness Visit (Medicare)  07/12/2024    COVID-19 Vaccine (5 - 2024-25 season) 09/01/2024       
ejection fraction class II stage C. Blood pressure today was 110/60. Follow-up appointments are scheduled with Dr. Valdes later this month and with Kam Ernst in 07/2025 for congestive heart failure. Current medications include simvastatin, potassium, and Bumex.    For complex sleep apnea syndrome, an alternative device is used due to a shunt, as magnetic headgear is contraindicated. The device occasionally dislodges during the night but is beneficial when it remains in place.     Levothyroxine 112 mcg is taken for hypothyroidism.     Bowel movements are reported as normal.    Review of Systems   Constitutional:  Negative for activity change, appetite change and fever.   Respiratory:  Negative for cough, chest tightness and shortness of breath.    Cardiovascular:  Negative for chest pain and palpitations.   Gastrointestinal:  Negative for abdominal pain, constipation, diarrhea and vomiting.   Musculoskeletal:  Positive for arthralgias and myalgias.   Skin:  Negative for rash.   Neurological:  Negative for dizziness, weakness, numbness and headaches.   Psychiatric/Behavioral:  The patient is not nervous/anxious.           Objective   Blood pressure 110/60, pulse 72, temperature 97.6 °F (36.4 °C), temperature source Oral, resp. rate 12, height 1.575 m (5' 2.01\"), weight 60.4 kg (133 lb 3.2 oz), SpO2 99%.  Physical Exam  Constitutional:       General: She is not in acute distress.     Appearance: She is well-developed.      Interventions: She is not intubated.  HENT:      Head: Normocephalic and atraumatic.      Right Ear: External ear normal.      Left Ear: External ear normal.   Eyes:      General: Lids are normal.      Conjunctiva/sclera: Conjunctivae normal.   Cardiovascular:      Rate and Rhythm: Normal rate and regular rhythm.   Pulmonary:      Effort: Pulmonary effort is normal. No tachypnea, bradypnea, prolonged expiration, respiratory distress or retractions. She is not intubated.      Breath sounds:

## 2025-04-28 ENCOUNTER — OFFICE VISIT (OUTPATIENT)
Dept: CARDIOLOGY CLINIC | Age: 89
End: 2025-04-28
Payer: MEDICARE

## 2025-04-28 VITALS
WEIGHT: 132 LBS | HEART RATE: 76 BPM | SYSTOLIC BLOOD PRESSURE: 124 MMHG | BODY MASS INDEX: 24.29 KG/M2 | DIASTOLIC BLOOD PRESSURE: 60 MMHG | HEIGHT: 62 IN

## 2025-04-28 DIAGNOSIS — R06.02 SHORTNESS OF BREATH: ICD-10-CM

## 2025-04-28 DIAGNOSIS — Q21.0 VSD (VENTRICULAR SEPTAL DEFECT AND AORTIC ARCH HYPOPLASIA: ICD-10-CM

## 2025-04-28 DIAGNOSIS — Q25.42 VSD (VENTRICULAR SEPTAL DEFECT AND AORTIC ARCH HYPOPLASIA: ICD-10-CM

## 2025-04-28 DIAGNOSIS — E78.01 FAMILIAL HYPERCHOLESTEROLEMIA: ICD-10-CM

## 2025-04-28 DIAGNOSIS — I25.10 CORONARY ARTERY DISEASE INVOLVING NATIVE CORONARY ARTERY OF NATIVE HEART WITHOUT ANGINA PECTORIS: Primary | ICD-10-CM

## 2025-04-28 PROCEDURE — G8427 DOCREV CUR MEDS BY ELIG CLIN: HCPCS | Performed by: NUCLEAR MEDICINE

## 2025-04-28 PROCEDURE — 1159F MED LIST DOCD IN RCRD: CPT | Performed by: NUCLEAR MEDICINE

## 2025-04-28 PROCEDURE — 93000 ELECTROCARDIOGRAM COMPLETE: CPT | Performed by: NUCLEAR MEDICINE

## 2025-04-28 PROCEDURE — 1036F TOBACCO NON-USER: CPT | Performed by: NUCLEAR MEDICINE

## 2025-04-28 PROCEDURE — 1123F ACP DISCUSS/DSCN MKR DOCD: CPT | Performed by: NUCLEAR MEDICINE

## 2025-04-28 PROCEDURE — 1090F PRES/ABSN URINE INCON ASSESS: CPT | Performed by: NUCLEAR MEDICINE

## 2025-04-28 PROCEDURE — 99214 OFFICE O/P EST MOD 30 MIN: CPT | Performed by: NUCLEAR MEDICINE

## 2025-04-28 PROCEDURE — G8420 CALC BMI NORM PARAMETERS: HCPCS | Performed by: NUCLEAR MEDICINE

## 2025-04-28 NOTE — PROGRESS NOTES
[facility] [place] [facility]  [facility] CARDIOLOG[facility]  [address]  [address]  [place] [address]  Dept: [phone]  Dept Fax: [phone]  Loc: [phone]    Visit Date: 2025    Елена Cannon is a 89 y.o. female who presents todayfor:  Chief Complaint   Patient presents with    Follow-up    Hypertension    Shortness of Breath    Hyperlipidemia   Know VSD and CHF  On diuretics  No chest pain  Some baseline dyspnea  some changes in breathing  Some bloating   Some dizziness  Has  shunt  Known borderline BP  No syncope  On statins for hyperlipidemia  No issues with the meds   Cath 2014 was normal     HPI:  HPI  Past Medical History:   Diagnosis Date    Arthritis     CAD (coronary artery disease)     Heart murmur     Hyperlipidemia     Migraine 10/31/2013    Sleep apnea     Thyroid disease     Hypothyroid    VSD (ventricular septal defect)       Past Surgical History:   Procedure Laterality Date    BLADDER SUSPENSION       SECTION      x2    COLONOSCOPY      DILATATION, ESOPHAGUS      DOPPLER ECHOCARDIOGRAPHY  10-01-10    EF 45-50%    EYE SURGERY Bilateral     cataracts    HYSTERECTOMY (CERVIX STATUS UNKNOWN)      KNEE CARTILAGE SURGERY Right     OTHER SURGICAL HISTORY  2015    VAGINAL ANTERIOR AND POSTERIOR REPAIR , SOLYX BLADDER SLING, CYSTOSCOPY    SHOULDER SURGERY Right     SINUS SURGERY      TUBAL LIGATION  196    VENTRICULOPERITONEAL SHUNT Right 10/11/2016    Dr Phillips     Family History   Problem Relation Age of Onset    Cancer Sister     Heart Disease Sister      Social History     Tobacco Use    Smoking status: Never    Smokeless tobacco: Never    Tobacco comments:     Never smoker   Substance Use Topics    Alcohol use: No     Alcohol/week: 0.0 standard drinks of alcohol      Current Outpatient Medications   Medication Sig Dispense Refill    naproxen (NAPROSYN) 250 MG tablet Take 2 tablets by mouth 2 times daily (with meals)      levothyroxine

## 2025-05-12 ENCOUNTER — HOSPITAL ENCOUNTER (OUTPATIENT)
Age: 89
Discharge: HOME | End: 2025-05-14
Attending: NUCLEAR MEDICINE
Payer: MEDICARE

## 2025-05-12 DIAGNOSIS — R06.02 SHORTNESS OF BREATH: ICD-10-CM

## 2025-05-12 LAB
ECHO AO ASC DIAM: 3.2 CM
ECHO AR MAX VEL PISA: 3.5 M/S
ECHO AV CUSP MM: 1.8 CM
ECHO AV PEAK GRADIENT: 6 MMHG
ECHO AV PEAK VELOCITY: 1.2 M/S
ECHO AV REGURGITANT PHT: 592 MS
ECHO AV VELOCITY RATIO: 0.67
ECHO EST RA PRESSURE: 3 MMHG
ECHO IVC PROX: 1.7 CM
ECHO LA AREA 2C: 20.4 CM2
ECHO LA AREA 4C: 21.6 CM2
ECHO LA DIAMETER: 4.5 CM
ECHO LA MAJOR AXIS: 5.9 CM
ECHO LA MINOR AXIS: 5.5 CM
ECHO LA VOL BP: 62 ML (ref 22–52)
ECHO LA VOL MOD A2C: 62 ML (ref 22–52)
ECHO LA VOL MOD A4C: 59 ML (ref 22–52)
ECHO LV E' LATERAL VELOCITY: 7.4 CM/S
ECHO LV E' SEPTAL VELOCITY: 5.3 CM/S
ECHO LV EJECTION FRACTION 3D: 55 %
ECHO LV FRACTIONAL SHORTENING: 33 % (ref 28–44)
ECHO LV INTERNAL DIMENSION DIASTOLIC: 4.6 CM (ref 3.9–5.3)
ECHO LV INTERNAL DIMENSION SYSTOLIC: 3.1 CM
ECHO LV ISOVOLUMETRIC RELAXATION TIME (IVRT): 63 MS
ECHO LV IVSD: 1.1 CM (ref 0.6–0.9)
ECHO LV MASS 2D: 181.2 G (ref 67–162)
ECHO LV POSTERIOR WALL DIASTOLIC: 1.1 CM (ref 0.6–0.9)
ECHO LV RELATIVE WALL THICKNESS RATIO: 0.48
ECHO LVOT PEAK GRADIENT: 2 MMHG
ECHO LVOT PEAK VELOCITY: 0.8 M/S
ECHO MV A VELOCITY: 0.76 M/S
ECHO MV E DECELERATION TIME (DT): 257 MS
ECHO MV E VELOCITY: 0.73 M/S
ECHO MV E/A RATIO: 0.96
ECHO MV E/E' LATERAL: 9.86
ECHO MV E/E' RATIO (AVERAGED): 11.82
ECHO MV E/E' SEPTAL: 13.77
ECHO MV REGURGITANT PEAK GRADIENT: 108 MMHG
ECHO MV REGURGITANT PEAK VELOCITY: 5.2 M/S
ECHO PV MAX VELOCITY: 0.8 M/S
ECHO PV PEAK GRADIENT: 3 MMHG
ECHO RIGHT VENTRICULAR SYSTOLIC PRESSURE (RVSP): 65 MMHG
ECHO RV INTERNAL DIMENSION: 3.5 CM
ECHO RV TAPSE: 2.4 CM (ref 1.7–?)
ECHO TV E WAVE: 0.7 M/S
ECHO TV REGURGITANT MAX VELOCITY: 3.95 M/S
ECHO TV REGURGITANT PEAK GRADIENT: 62 MMHG

## 2025-05-12 PROCEDURE — 93306 TTE W/DOPPLER COMPLETE: CPT

## 2025-05-12 RX ORDER — BUMETANIDE 0.5 MG/1
1 TABLET ORAL DAILY
Qty: 180 TABLET | Refills: 3 | Status: SHIPPED | OUTPATIENT
Start: 2025-05-12

## 2025-05-31 ENCOUNTER — HOSPITAL ENCOUNTER (EMERGENCY)
Age: 89
Discharge: HOME | End: 2025-05-31
Attending: EMERGENCY MEDICINE
Payer: MEDICARE

## 2025-05-31 ENCOUNTER — APPOINTMENT (OUTPATIENT)
Dept: GENERAL RADIOLOGY | Age: 89
End: 2025-05-31
Payer: MEDICARE

## 2025-05-31 ENCOUNTER — APPOINTMENT (OUTPATIENT)
Dept: CT IMAGING | Age: 89
End: 2025-05-31
Payer: MEDICARE

## 2025-05-31 VITALS
RESPIRATION RATE: 18 BRPM | HEART RATE: 76 BPM | BODY MASS INDEX: 23.92 KG/M2 | WEIGHT: 130 LBS | HEIGHT: 62 IN | TEMPERATURE: 98 F | SYSTOLIC BLOOD PRESSURE: 121 MMHG | DIASTOLIC BLOOD PRESSURE: 54 MMHG | OXYGEN SATURATION: 99 %
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
## 2025-05-31 DIAGNOSIS — M25.511 ACUTE PAIN OF RIGHT SHOULDER: Primary | ICD-10-CM

## 2025-05-31 PROCEDURE — 73030 X-RAY EXAM OF SHOULDER: CPT

## 2025-05-31 PROCEDURE — 99284 EMERGENCY DEPT VISIT MOD MDM: CPT

## 2025-05-31 PROCEDURE — 70450 CT HEAD/BRAIN W/O DYE: CPT

## 2025-05-31 PROCEDURE — 71045 X-RAY EXAM CHEST 1 VIEW: CPT

## 2025-05-31 PROCEDURE — 72125 CT NECK SPINE W/O DYE: CPT

## 2025-05-31 ASSESSMENT — PAIN - FUNCTIONAL ASSESSMENT
PAIN_FUNCTIONAL_ASSESSMENT: NONE - DENIES PAIN
PAIN_FUNCTIONAL_ASSESSMENT: 0-10

## 2025-05-31 ASSESSMENT — PAIN SCALES - GENERAL: PAINLEVEL_OUTOF10: 8

## 2025-05-31 NOTE — ED PROVIDER NOTES
[facility] EMERGENCY DEPARTMENT      EMERGENCY MEDICINE     Pt Name: Елена Cannon  MRN: [id number]  Birthdate 1935  Date of evaluation: 2025  Provider: Zeus Ramos DO  Supervising Physician: Ced Chance DO    CHIEF COMPLAINT       Chief Complaint   Patient presents with    Shoulder Pain     HISTORY OF PRESENT ILLNESS   Елена Cannon is a 89 y.o. female with a h/o NPH s/p shunt placement, CAD who presents to the emergency department from home with her daughter for evaluation of right shoulder pain status post fall.  This occurred today, she was walking outside rounding a corner when she tripped and landed on that shoulder.  Patient states she struck with post and the break.  She does not think she hit her head, she denies any LOC.  Her only complaint is right shoulder pain.  She is not having any pain at rest, only has pain with movement of that shoulder.    PASTMEDICAL HISTORY     Past Medical History:   Diagnosis Date    Arthritis     CAD (coronary artery disease)     Heart murmur     Hyperlipidemia     Migraine 10/31/2013    Sleep apnea     Thyroid disease     Hypothyroid    VSD (ventricular septal defect)        Patient Active Problem List   Diagnosis Code    VSD (ventricular septal defect) Q21.0    Heart murmur R01.1    GERD (gastroesophageal reflux disease) K21.9    Hyperlipemia E78.5    Arthritis M19.90    Complex sleep apnea syndrome G47.39    NPH (normal pressure hydrocephalus) (HCC) G91.2    Essential hypertension I10    Acquired hypothyroidism E03.9    Post-menopausal Z78.0     SURGICAL HISTORY       Past Surgical History:   Procedure Laterality Date    BLADDER SUSPENSION       SECTION      x2    COLONOSCOPY      DILATATION, ESOPHAGUS      DOPPLER ECHOCARDIOGRAPHY  10-01-10    EF 45-50%    EYE SURGERY Bilateral     cataracts    HYSTERECTOMY (CERVIX STATUS UNKNOWN)      KNEE CARTILAGE SURGERY Right     OTHER SURGICAL HISTORY  2015    VAGINAL ANTERIOR AND

## 2025-05-31 NOTE — ED TRIAGE NOTES
Pt to ED c/o right shoulder pain. Pt states she was working in the yard and tripped over something. Pt denies hitting her head or and LOC. Pt states her shoulder hurts more when she try's moving it. Pt A&O. VSS

## 2025-05-31 NOTE — DISCHARGE INSTRUCTIONS
You were seen here today for a fall and were not found to have any serious injuries.  Alternate Tylenol or Profen for pain.  Wear the sling for the next few days for comfort.  After that wear it as needed, I would advise making sure to take it off a few times each day to practice range of motion with your shoulder.  Follow-up with the orthopedic [facility] [place] on Monday morning for further evaluation.  Return here develop worsening pain

## 2025-07-02 DIAGNOSIS — E78.00 PURE HYPERCHOLESTEROLEMIA: ICD-10-CM

## 2025-07-02 DIAGNOSIS — E03.9 ACQUIRED HYPOTHYROIDISM: ICD-10-CM

## 2025-07-02 RX ORDER — LEVOTHYROXINE SODIUM 112 UG/1
112 TABLET ORAL DAILY
Qty: 90 TABLET | Refills: 1 | Status: SHIPPED | OUTPATIENT
Start: 2025-07-02

## 2025-07-02 RX ORDER — SIMVASTATIN 10 MG
10 TABLET ORAL NIGHTLY
Qty: 90 TABLET | Refills: 1 | Status: SHIPPED | OUTPATIENT
Start: 2025-07-02

## 2025-07-02 NOTE — TELEPHONE ENCOUNTER
Елена Cannon called requesting a refill on the following medications:  Requested Prescriptions     Pending Prescriptions Disp Refills    simvastatin (ZOCOR) 10 MG tablet [Pharmacy Med Name: SIMVASTATIN 10 MG TABLET] 90 tablet 1     Sig: TAKE 1 TABLET BY MOUTH EVERY DAY AT NIGHT    levothyroxine (SYNTHROID) 112 MCG tablet [Pharmacy Med Name: LEVOTHYROXINE 112 MCG TABLET] 90 tablet 1     Sig: TAKE 1 TABLET BY MOUTH EVERY DAY       Date of last visit: 4/9/2025  Date of next visit (if applicable):8/11/2025  Date of last refill: 01/02/2025  Pharmacy Name: [facility] MA

## 2025-07-09 ENCOUNTER — OFFICE VISIT (OUTPATIENT)
Dept: CARDIOLOGY CLINIC | Age: 89
End: 2025-07-09
Payer: MEDICARE

## 2025-07-09 VITALS
DIASTOLIC BLOOD PRESSURE: 52 MMHG | SYSTOLIC BLOOD PRESSURE: 118 MMHG | BODY MASS INDEX: 23.78 KG/M2 | OXYGEN SATURATION: 99 % | HEART RATE: 74 BPM | WEIGHT: 130 LBS

## 2025-07-09 DIAGNOSIS — Q21.0 VSD (VENTRICULAR SEPTAL DEFECT): ICD-10-CM

## 2025-07-09 DIAGNOSIS — I50.30 HEART FAILURE WITH PRESERVED EJECTION FRACTION, NYHA CLASS II (HCC): Primary | ICD-10-CM

## 2025-07-09 DIAGNOSIS — G47.33 OSA TREATED WITH BIPAP: ICD-10-CM

## 2025-07-09 PROCEDURE — G8420 CALC BMI NORM PARAMETERS: HCPCS | Performed by: NURSE PRACTITIONER

## 2025-07-09 PROCEDURE — 1123F ACP DISCUSS/DSCN MKR DOCD: CPT | Performed by: NURSE PRACTITIONER

## 2025-07-09 PROCEDURE — G8427 DOCREV CUR MEDS BY ELIG CLIN: HCPCS | Performed by: NURSE PRACTITIONER

## 2025-07-09 PROCEDURE — 99214 OFFICE O/P EST MOD 30 MIN: CPT | Performed by: NURSE PRACTITIONER

## 2025-07-09 PROCEDURE — 1159F MED LIST DOCD IN RCRD: CPT | Performed by: NURSE PRACTITIONER

## 2025-07-09 PROCEDURE — 1036F TOBACCO NON-USER: CPT | Performed by: NURSE PRACTITIONER

## 2025-07-09 PROCEDURE — 1090F PRES/ABSN URINE INCON ASSESS: CPT | Performed by: NURSE PRACTITIONER

## 2025-07-09 NOTE — PATIENT INSTRUCTIONS
Continue current medications as prescribed.    Stay as active as you can.     Eat heart healthy diet.     Follow-up with your PCP as scheduled.    Follow-up with Dr. Ramos as scheduled or sooner if need.     Follow-up with WILBER Watson CNP in 6 months or sooner.     You may receive a survey regarding the care you received during your visit.  Your input is valuable to us.  We encourage you to complete and return your survey.  We hope you will choose us in the future for your healthcare needs.    Your nurses today were Lee Ann Teran and Coco.  Office hours:   Mon-Thurs 8-4:30  Friday 8-12  Phone: [phone]    Continue:  Continue current medications  Daily weights and record  Fluid restriction of 2 Liters per day  Limit sodium in diet to around 3730-4897 mg/day  Monitor BP    Call the [facility] Failure Clinic for any of the following symptoms:   Weight gain of 3 pounds in 1 day or 5 pounds in 1 week   - may take an extra 1/2 tablet of the Bumex if weight goes up more than 3 pounds overnight or if weight goes up 4 - 6 pounds in a week.   Write on you log the days you take the extra Bumex. If needing more regularly will increase the daily dose.   Increased shortness of breath  Shortness of breath while laying down  Chest pain  Swelling in feet, ankles or legs  Bloating in abdomen  Fatigue

## 2025-07-09 NOTE — PROGRESS NOTES
Елена Cannon (:  1935) is a 89 y.o. female, Established patient, here for 6 month follow-up evaluation.    Primary Cardiologist: Dr. Ramos    Check-Up (CHF )    HPI:  Last seen in CHF clinic on 2025 for 6 month follow-up. Per clinic note:   Concerns today: BP has been up   Walks a mile a day - now a little short of breath with it - used to not be that way  - not sure about the timing of onset  - swims 1 hour twice a week - water exercise   - really tired after - during it does ok - just notices little harder breathing  - now having some swelling in lower legs - pretty much resolves overnight  - dizziness stable (shunt) no chest discomfort or anginal type pain/pressure  Activity: ADLs performed w/o SOB  The patient's condition/symptoms are stable. BP and weight log reviewed. Overall very stable with  -140/ at max. Notices little swelling in legs from time to time. Nothing consistent and no volume on exam today. Maybe little more short of breath with exertion - not limiting her. Active, walking, participating in water aerobics. Fatigue after. No anginal sx. Weight stable. No palpitations. Has  shunt; little lightheadedness not uncommon for her.   Known VSD with L-->R shunt - ? Sx if little more volume on board. Discussed watching diet, drinking adequate water per CHF guidelines. Will watch weight - use prn Bumex as instructed and continue to monitor sx. Could consider repeat echo and stress if sx persists.        CHF clinic 6 month follow-up on 2025:     Assessment & Plan  HFpEF 55%   VSD - Lt to Rt  MISAEL on BiPAP  Neg stress 2023      1. Heart failure with preserved ejection fraction:  - Blood pressure readings are within the normal range, consistently in the 120s or 130s.  - Pulse rate is within the normal range, between 60s and 70s, occasionally reaching 90.  - Weight has remained stable since 2025, with a slight increase of 4 pounds from the first to the seventh of this

## 2025-08-11 ENCOUNTER — OFFICE VISIT (OUTPATIENT)
Dept: FAMILY MEDICINE CLINIC | Age: 89
End: 2025-08-11

## 2025-08-11 VITALS
OXYGEN SATURATION: 97 % | TEMPERATURE: 97.7 F | WEIGHT: 129.4 LBS | RESPIRATION RATE: 12 BRPM | HEIGHT: 62 IN | HEART RATE: 85 BPM | DIASTOLIC BLOOD PRESSURE: 60 MMHG | SYSTOLIC BLOOD PRESSURE: 120 MMHG | BODY MASS INDEX: 23.81 KG/M2

## 2025-08-11 DIAGNOSIS — E78.00 PURE HYPERCHOLESTEROLEMIA: ICD-10-CM

## 2025-08-11 DIAGNOSIS — Z00.00 INITIAL MEDICARE ANNUAL WELLNESS VISIT: Primary | ICD-10-CM

## 2025-08-11 DIAGNOSIS — E03.9 ACQUIRED HYPOTHYROIDISM: ICD-10-CM

## 2025-08-11 DIAGNOSIS — N18.2 CHRONIC RENAL DISEASE, STAGE 2, MILDLY DECREASED GLOMERULAR FILTRATION RATE BETWEEN 60-89 ML/MIN/1.73 SQUARE METER: ICD-10-CM

## 2025-08-11 DIAGNOSIS — I10 ESSENTIAL HYPERTENSION: ICD-10-CM

## 2025-08-11 ASSESSMENT — PATIENT HEALTH QUESTIONNAIRE - PHQ9
SUM OF ALL RESPONSES TO PHQ QUESTIONS 1-9: 0
SUM OF ALL RESPONSES TO PHQ QUESTIONS 1-9: 0
2. FEELING DOWN, DEPRESSED OR HOPELESS: NOT AT ALL
1. LITTLE INTEREST OR PLEASURE IN DOING THINGS: NOT AT ALL
SUM OF ALL RESPONSES TO PHQ QUESTIONS 1-9: 0
SUM OF ALL RESPONSES TO PHQ QUESTIONS 1-9: 0

## 2025-08-11 ASSESSMENT — LIFESTYLE VARIABLES
HOW OFTEN DO YOU HAVE A DRINK CONTAINING ALCOHOL: NEVER
HOW MANY STANDARD DRINKS CONTAINING ALCOHOL DO YOU HAVE ON A TYPICAL DAY: PATIENT DOES NOT DRINK